# Patient Record
Sex: FEMALE | Race: WHITE | Employment: OTHER | URBAN - METROPOLITAN AREA
[De-identification: names, ages, dates, MRNs, and addresses within clinical notes are randomized per-mention and may not be internally consistent; named-entity substitution may affect disease eponyms.]

---

## 2019-02-11 ENCOUNTER — HOSPITAL ENCOUNTER (EMERGENCY)
Age: 69
Discharge: HOME | End: 2019-02-11
Payer: COMMERCIAL

## 2019-02-11 ENCOUNTER — APPOINTMENT (OUTPATIENT)
Dept: GENERAL RADIOLOGY | Age: 69
End: 2019-02-11
Payer: COMMERCIAL

## 2019-02-11 VITALS
WEIGHT: 130 LBS | TEMPERATURE: 98.5 F | BODY MASS INDEX: 23.04 KG/M2 | SYSTOLIC BLOOD PRESSURE: 146 MMHG | OXYGEN SATURATION: 100 % | HEART RATE: 87 BPM | HEIGHT: 63 IN | RESPIRATION RATE: 16 BRPM | DIASTOLIC BLOOD PRESSURE: 88 MMHG

## 2019-02-11 DIAGNOSIS — M77.8 TENDINITIS OF SHOULDER, LEFT: Primary | ICD-10-CM

## 2019-02-11 PROCEDURE — 96372 THER/PROPH/DIAG INJ SC/IM: CPT

## 2019-02-11 PROCEDURE — 99283 EMERGENCY DEPT VISIT LOW MDM: CPT

## 2019-02-11 PROCEDURE — 73030 X-RAY EXAM OF SHOULDER: CPT

## 2019-02-11 PROCEDURE — 6360000002 HC RX W HCPCS: Performed by: PHYSICIAN ASSISTANT

## 2019-02-11 RX ORDER — METHYLPREDNISOLONE ACETATE 80 MG/ML
80 INJECTION, SUSPENSION INTRA-ARTICULAR; INTRALESIONAL; INTRAMUSCULAR; SOFT TISSUE ONCE
Status: COMPLETED | OUTPATIENT
Start: 2019-02-11 | End: 2019-02-11

## 2019-02-11 RX ORDER — ETODOLAC 400 MG/1
400 TABLET, FILM COATED ORAL 2 TIMES DAILY
Qty: 14 TABLET | Refills: 0 | Status: SHIPPED | OUTPATIENT
Start: 2019-02-11

## 2019-02-11 RX ORDER — TRAMADOL HYDROCHLORIDE 50 MG/1
50 TABLET ORAL EVERY 8 HOURS PRN
Qty: 9 TABLET | Refills: 0 | Status: SHIPPED | OUTPATIENT
Start: 2019-02-11 | End: 2019-02-14

## 2019-02-11 RX ORDER — KETOROLAC TROMETHAMINE 30 MG/ML
30 INJECTION, SOLUTION INTRAMUSCULAR; INTRAVENOUS ONCE
Status: COMPLETED | OUTPATIENT
Start: 2019-02-11 | End: 2019-02-11

## 2019-02-11 RX ADMIN — METHYLPREDNISOLONE ACETATE 80 MG: 80 INJECTION, SUSPENSION INTRA-ARTICULAR; INTRALESIONAL; INTRAMUSCULAR; SOFT TISSUE at 00:52

## 2019-02-11 RX ADMIN — KETOROLAC TROMETHAMINE 30 MG: 30 INJECTION, SOLUTION INTRAMUSCULAR; INTRAVENOUS at 00:52

## 2019-02-11 ASSESSMENT — PAIN DESCRIPTION - ORIENTATION: ORIENTATION: LEFT

## 2019-02-11 ASSESSMENT — ENCOUNTER SYMPTOMS
COLOR CHANGE: 0
ABDOMINAL PAIN: 0
ALLERGIC/IMMUNOLOGIC NEGATIVE: 1
TROUBLE SWALLOWING: 0
APNEA: 0
SHORTNESS OF BREATH: 0
EYE PAIN: 0

## 2019-02-11 ASSESSMENT — PAIN DESCRIPTION - PAIN TYPE: TYPE: ACUTE PAIN

## 2019-02-11 ASSESSMENT — PAIN SCALES - GENERAL
PAINLEVEL_OUTOF10: 10
PAINLEVEL_OUTOF10: 9

## 2019-02-11 ASSESSMENT — PAIN DESCRIPTION - FREQUENCY: FREQUENCY: CONTINUOUS

## 2019-02-11 ASSESSMENT — PAIN DESCRIPTION - DESCRIPTORS: DESCRIPTORS: ACHING

## 2019-02-11 ASSESSMENT — PAIN DESCRIPTION - LOCATION: LOCATION: SHOULDER

## 2023-01-06 ENCOUNTER — HOSPITAL ENCOUNTER (EMERGENCY)
Age: 73
Discharge: HOME | End: 2023-01-06
Attending: EMERGENCY MEDICINE
Payer: MEDICARE

## 2023-01-06 ENCOUNTER — APPOINTMENT (OUTPATIENT)
Dept: CT IMAGING | Age: 73
End: 2023-01-06
Payer: MEDICARE

## 2023-01-06 ENCOUNTER — APPOINTMENT (OUTPATIENT)
Dept: GENERAL RADIOLOGY | Age: 73
End: 2023-01-06
Payer: MEDICARE

## 2023-01-06 VITALS
DIASTOLIC BLOOD PRESSURE: 85 MMHG | RESPIRATION RATE: 18 BRPM | WEIGHT: 128 LBS | OXYGEN SATURATION: 98 % | BODY MASS INDEX: 22.68 KG/M2 | HEIGHT: 63 IN | SYSTOLIC BLOOD PRESSURE: 125 MMHG | HEART RATE: 78 BPM | TEMPERATURE: 97.8 F

## 2023-01-06 DIAGNOSIS — S82.892A CLOSED FRACTURE OF LEFT ANKLE, INITIAL ENCOUNTER: ICD-10-CM
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
## 2023-01-06 PROCEDURE — 99284 EMERGENCY DEPT VISIT MOD MDM: CPT

## 2023-01-06 PROCEDURE — 73610 X-RAY EXAM OF ANKLE: CPT

## 2023-01-06 PROCEDURE — 73030 X-RAY EXAM OF SHOULDER: CPT

## 2023-01-06 PROCEDURE — 73502 X-RAY EXAM HIP UNI 2-3 VIEWS: CPT

## 2023-01-06 PROCEDURE — 72125 CT NECK SPINE W/O DYE: CPT

## 2023-01-06 PROCEDURE — 70450 CT HEAD/BRAIN W/O DYE: CPT

## 2023-01-06 RX ORDER — OXYCODONE HYDROCHLORIDE AND ACETAMINOPHEN 5; 325 MG/1; MG/1
1 TABLET ORAL EVERY 6 HOURS PRN
Qty: 12 TABLET | Refills: 0 | Status: SHIPPED | OUTPATIENT
Start: 2023-01-06 | End: 2023-01-09

## 2023-01-06 RX ORDER — OXYCODONE HYDROCHLORIDE AND ACETAMINOPHEN 5; 325 MG/1; MG/1
1 TABLET ORAL
Status: DISCONTINUED | OUTPATIENT
Start: 2023-01-06 | End: 2023-01-06 | Stop reason: HOSPADM

## 2023-01-06 ASSESSMENT — PAIN DESCRIPTION - FREQUENCY: FREQUENCY: INTERMITTENT

## 2023-01-06 ASSESSMENT — PAIN SCALES - GENERAL: PAINLEVEL_OUTOF10: 2

## 2023-01-06 ASSESSMENT — PAIN DESCRIPTION - ORIENTATION: ORIENTATION: RIGHT;LEFT

## 2023-01-06 ASSESSMENT — ENCOUNTER SYMPTOMS
VOMITING: 0
BACK PAIN: 0
SORE THROAT: 0
DIARRHEA: 0
NAUSEA: 0
ABDOMINAL PAIN: 0
COUGH: 0
SHORTNESS OF BREATH: 0

## 2023-01-06 ASSESSMENT — PAIN - FUNCTIONAL ASSESSMENT
PAIN_FUNCTIONAL_ASSESSMENT: NONE - DENIES PAIN
PAIN_FUNCTIONAL_ASSESSMENT: 0-10

## 2023-01-06 ASSESSMENT — PAIN DESCRIPTION - LOCATION: LOCATION: BACK;ANKLE;LEG

## 2023-01-06 ASSESSMENT — PAIN DESCRIPTION - DESCRIPTORS: DESCRIPTORS: ACHING

## 2023-01-06 ASSESSMENT — PAIN DESCRIPTION - PAIN TYPE: TYPE: ACUTE PAIN

## 2023-01-06 ASSESSMENT — LIFESTYLE VARIABLES: HOW OFTEN DO YOU HAVE A DRINK CONTAINING ALCOHOL: NEVER

## 2023-01-06 NOTE — ED TRIAGE NOTES
Pt states that she fell down 2 steps on Monday after tripping. Pt states that since then she has been having left lower leg pain, right ankle pain, and lower back pain. Pt states that she thinks she may have had LOC when she fell for a few seconds. Pt states that she hit her head and was having pain on the right side of her face earlier this week.

## 2023-01-06 NOTE — ED PROVIDER NOTES
[address] [facility] ED  eMERGENCYdEPARTMENT eNCOUnter      Pt Name: Anne Bernal  MRN: [id number]  [place] 1950  Date of evaluation: 1/6/2023  Eliana Quiroga MD    CHIEF COMPLAINT           HPI  Anne Bernal is a 74 y.o. female per chart review has a h/o depression presents to the ED s/p fall. Pt notes on Monday she was carrying things, lost her balance and fell and hit her head. Pt notes moderate, constant, aching R and L ankle pain and R hip pain. Pt hit her head but denies LOC. Pt denies fever, neck pain, cp, sob, ab pain, dysuria, hematuria, diarrhea. ROS  Review of Systems   Constitutional:  Negative for activity change, chills and fever. HENT:  Negative for ear pain and sore throat. Eyes:  Negative for visual disturbance. Respiratory:  Negative for cough and shortness of breath. Cardiovascular:  Negative for chest pain, palpitations and leg swelling. Gastrointestinal:  Negative for abdominal pain, diarrhea, nausea and vomiting. Genitourinary:  Negative for dysuria. Musculoskeletal:  Negative for back pain. R ankle pain, L ankle pain, R hip pain     Skin:  Negative for rash. Neurological:  Negative for dizziness and weakness. Except as noted above the remainder of the review of systems was reviewed and negative. PAST MEDICAL HISTORY     Past Medical History:   Diagnosis Date    Cancer [facility])     Depression     Thyroid disease          SURGICAL HISTORY       Past Surgical History:   Procedure Laterality Date    HYSTERECTOMY (CERVIX STATUS UNKNOWN)      TONSILLECTOMY           CURRENTMEDICATIONS       Previous Medications    B COMPLEX VITAMINS CAPSULE    Take 1 capsule by mouth daily. DESVENLAFAXINE (PRISTIQ) 50 MG TB24    Take 50 mg by mouth daily. ETODOLAC (LODINE) 400 MG TABLET    Take 1 tablet by mouth 2 times daily    LEVOTHYROXINE (SYNTHROID) 25 MCG TABLET    Take 50 mcg by mouth daily.          ALLERGIES     Avelox [moxifloxacin hcl in nacl], Codeine, and Pcn [penicillins]    FAMILY HISTORY     History reviewed. No pertinent family history. SOCIAL HISTORY       Social History     Socioeconomic History    Marital status: Single     Spouse name: None    Number of children: None    Years of education: None    Highest education level: None   Tobacco Use    Smoking status: Never   Substance and Sexual Activity    Alcohol use: No    Drug use: No         PHYSICAL EXAM       ED Triage Vitals [01/06/23 1614]   BP Temp Temp Source Heart Rate Resp SpO2 Height Weight   136/74 97.8 °F (36.6 °C) Oral 78 18 99 % 5' 3\" (1.6 m) 128 lb (58.1 kg)       Physical Exam  Vitals and nursing note reviewed. Constitutional:       Appearance: She is well-developed. HENT:      Head: Normocephalic. Right Ear: External ear normal.      Left Ear: External ear normal.   Eyes:      Conjunctiva/sclera: Conjunctivae normal.      Pupils: Pupils are equal, round, and reactive to light. Cardiovascular:      Rate and Rhythm: Normal rate and regular rhythm. Heart sounds: Normal heart sounds. Pulmonary:      Effort: Pulmonary effort is normal.      Breath sounds: Normal breath sounds. Abdominal:      General: Bowel sounds are normal. There is no distension. Palpations: Abdomen is soft. Tenderness: There is no abdominal tenderness. Musculoskeletal:         General: Normal range of motion. Cervical back: Normal range of motion and neck supple. Comments: +R ankle pain and swelling. +Bruising. 2+ R DP pulse. +L ankle pain and swelling. 2+ L DP pulse. +R hip pain. Full ROM of R hip. Skin:     General: Skin is warm and dry. Neurological:      Mental Status: She is alert and oriented to person, place, and time. Psychiatric:         Mood and Affect: Mood normal.         MDM  73 yo female presents to the ED s/p fall on Monday. Pt is afebrile, hemodynamically stable. Pt given PO percocet in the ED. CT head, cspine negative. XR of R ankle negative.   XR of L ankle shows lateral malleolus fx. XR of shoulder, hip negative. Pt reassessed and doing better. Pt placed in a L ankle splint. Pt given prescription for percocet, ortho referral placed. Pt given ankle fx warning signs and will f/u with pcp. FINAL IMPRESSION      1. Fall, initial encounter    2.  Closed fracture of left ankle, initial encounter          DISPOSITION/PLAN   DISPOSITION Decision To Discharge 01/06/2023 06:51:59 PM        DISCHARGE MEDICATIONS:  [unfilled]         Sherman Mary MD(electronically signed)  Attending Emergency Physician            Sherman Mary MD  01/06/23 9796

## 2023-01-07 NOTE — ED NOTES
D/C instructions and rx given. Verbalized understanding. Denies any complaints at this time. States she will drive herself home. Took patient out via wheelchair with her crutches to her car.      Becky Sow RN  01/06/23 [id number]

## 2023-09-04 PROBLEM — R32 INCONTINENCE: Status: ACTIVE | Noted: 2023-09-04

## 2023-09-04 PROBLEM — F41.8 DEPRESSION WITH ANXIETY: Status: ACTIVE | Noted: 2023-09-04

## 2023-09-04 PROBLEM — E03.9 HYPOTHYROIDISM: Status: ACTIVE | Noted: 2023-09-04

## 2023-09-04 PROBLEM — F33.9 MAJOR DEPRESSION, RECURRENT (CMS-HCC): Status: ACTIVE | Noted: 2023-09-04

## 2023-09-04 PROBLEM — F41.9 ANXIETY: Status: ACTIVE | Noted: 2023-09-04

## 2023-09-04 PROBLEM — I65.29 CAROTID STENOSIS: Status: ACTIVE | Noted: 2023-09-04

## 2023-09-04 PROBLEM — R07.9 CHEST PAIN: Status: ACTIVE | Noted: 2023-09-04

## 2023-09-04 PROBLEM — K63.5 COLONIC POLYP: Status: ACTIVE | Noted: 2023-09-04

## 2023-09-04 PROBLEM — I10 HTN (HYPERTENSION), BENIGN: Status: ACTIVE | Noted: 2023-09-04

## 2023-09-04 PROBLEM — L90.0 LICHEN SCLEROSUS: Status: ACTIVE | Noted: 2023-09-04

## 2023-09-04 PROBLEM — R41.9 COGNITIVE COMPLAINTS: Status: ACTIVE | Noted: 2023-09-04

## 2023-09-04 PROBLEM — D21.9 LEIOMYOMA: Status: ACTIVE | Noted: 2023-09-04

## 2023-09-04 PROBLEM — M48.02 SPINAL STENOSIS OF CERVICAL REGION: Status: ACTIVE | Noted: 2023-09-04

## 2023-09-04 PROBLEM — F43.10 PTSD (POST-TRAUMATIC STRESS DISORDER): Status: ACTIVE | Noted: 2023-09-04

## 2023-09-04 PROBLEM — M19.012 DJD OF LEFT SHOULDER: Status: ACTIVE | Noted: 2023-09-04

## 2023-09-04 PROBLEM — N39.46 MIXED STRESS AND URGE URINARY INCONTINENCE: Status: ACTIVE | Noted: 2023-09-04

## 2023-09-04 PROBLEM — E78.5 HLD (HYPERLIPIDEMIA): Status: ACTIVE | Noted: 2023-09-04

## 2023-09-04 PROBLEM — N95.2 VAGINAL ATROPHY: Status: ACTIVE | Noted: 2023-09-04

## 2023-09-04 PROBLEM — D12.6 ADENOMATOUS COLON POLYP: Status: ACTIVE | Noted: 2023-09-04

## 2023-09-04 RX ORDER — BIOTIN 10 MG
1 TABLET ORAL DAILY
COMMUNITY
End: 2024-02-05 | Stop reason: ALTCHOICE

## 2023-09-04 RX ORDER — DULOXETIN HYDROCHLORIDE 20 MG/1
20 CAPSULE, DELAYED RELEASE ORAL 2 TIMES DAILY
COMMUNITY
Start: 2021-11-10 | End: 2023-10-10 | Stop reason: SDUPTHER

## 2023-09-04 RX ORDER — ALBUTEROL SULFATE 90 UG/1
AEROSOL, METERED RESPIRATORY (INHALATION)
COMMUNITY
Start: 2022-04-26

## 2023-09-04 RX ORDER — LEVOTHYROXINE SODIUM 50 UG/1
1 TABLET ORAL DAILY
COMMUNITY
Start: 2014-10-20

## 2023-09-04 RX ORDER — MIRTAZAPINE 15 MG/1
1 TABLET, FILM COATED ORAL NIGHTLY
COMMUNITY
Start: 2019-04-16 | End: 2023-10-02 | Stop reason: SDUPTHER

## 2023-09-04 RX ORDER — GENTAMICIN SULFATE 3 MG/G
OINTMENT OPHTHALMIC
COMMUNITY
Start: 2020-11-03 | End: 2024-02-05 | Stop reason: ALTCHOICE

## 2023-09-04 RX ORDER — LORAZEPAM 0.5 MG/1
0.5 TABLET ORAL
COMMUNITY
Start: 2020-01-28

## 2023-09-04 RX ORDER — CALCIUM CARBONATE 260MG(650)
1 TABLET,CHEWABLE ORAL DAILY
COMMUNITY

## 2023-09-04 RX ORDER — MULTIVITAMIN
1 TABLET ORAL DAILY
COMMUNITY

## 2023-09-04 RX ORDER — CALCIUM CARBONATE/VITAMIN D3 500-10/5ML
1 LIQUID (ML) ORAL DAILY
COMMUNITY

## 2023-09-04 RX ORDER — TRIAMCINOLONE ACETONIDE 1 MG/G
PASTE DENTAL
COMMUNITY
Start: 2021-04-15

## 2023-09-04 RX ORDER — CLINDAMYCIN PHOSPHATE 10 UG/ML
LOTION TOPICAL
COMMUNITY
Start: 2021-09-29 | End: 2024-02-05 | Stop reason: ALTCHOICE

## 2023-09-04 RX ORDER — CLOBETASOL PROPIONATE 0.5 MG/G
OINTMENT TOPICAL
COMMUNITY
Start: 2019-12-10

## 2023-09-04 RX ORDER — ESTRADIOL 0.1 MG/G
CREAM VAGINAL 2 TIMES WEEKLY
COMMUNITY
Start: 2019-12-10

## 2023-09-04 RX ORDER — FLUTICASONE PROPIONATE 50 MCG
1 SPRAY, SUSPENSION (ML) NASAL
COMMUNITY
Start: 2019-03-15

## 2023-10-02 DIAGNOSIS — F41.8 DEPRESSION WITH ANXIETY: ICD-10-CM

## 2023-10-02 RX ORDER — MIRTAZAPINE 15 MG/1
15 TABLET, FILM COATED ORAL NIGHTLY
Qty: 90 TABLET | Refills: 1 | Status: SHIPPED | OUTPATIENT
Start: 2023-10-02 | End: 2024-03-04 | Stop reason: DRUGHIGH

## 2023-10-10 ENCOUNTER — TELEMEDICINE (OUTPATIENT)
Dept: BEHAVIORAL HEALTH | Facility: CLINIC | Age: 73
End: 2023-10-10
Payer: MEDICARE

## 2023-10-10 DIAGNOSIS — F33.41 RECURRENT MAJOR DEPRESSIVE DISORDER, IN PARTIAL REMISSION (CMS-HCC): ICD-10-CM

## 2023-10-10 DIAGNOSIS — F41.9 ANXIETY: ICD-10-CM

## 2023-10-10 PROCEDURE — 99214 OFFICE O/P EST MOD 30 MIN: CPT | Performed by: PSYCHIATRY & NEUROLOGY

## 2023-10-10 RX ORDER — DOXYCYCLINE 100 MG/1
100 TABLET ORAL DAILY
COMMUNITY

## 2023-10-10 RX ORDER — DULOXETIN HYDROCHLORIDE 20 MG/1
20 CAPSULE, DELAYED RELEASE ORAL 2 TIMES DAILY
Qty: 180 CAPSULE | Refills: 1 | Status: SHIPPED | OUTPATIENT
Start: 2023-10-10 | End: 2024-02-05 | Stop reason: SDUPTHER

## 2023-10-10 ASSESSMENT — ENCOUNTER SYMPTOMS
DEPRESSION: 0
LOSS OF SENSATION IN FEET: 0
OCCASIONAL FEELINGS OF UNSTEADINESS: 0

## 2023-10-10 NOTE — PROGRESS NOTES
"Outpatient Psychiatry      Reason for Visit:   Follow up for depression, anxiety    Subjective   MsJillian Gomez is a 73-year-old woman with a history of depression, anxiety. Follow up done virtually on phone today (could not connect on [employer] or [email or web address]).     She says she is feeling \"okay.\"     She says she was taking duloxetine 60mg but she started to notice nausea so she decreased the dose to 40mg (20mg twice daily) in July and the nausea resolved.     She says her mood is stable. She says she feels \"good.\" She says she has not been crying like she used to.   Denies anhedonia - likes to read.   Sleep - okay. Mirtazapine helps with sleep. She says she loves staying up late but is trying to get enough sleep.   Appetite - stable.   Has good energy - goes to exercise regularly.   Concentration - fair.   No death wishes, suicidal thoughts, intent or plans.    She says she had a lot of stress in her new home because of a lot of issues but things are better now. She says she has not taken ativan in a long time.     No AVH. No paranoia or delusions.     Denies any new concerns with memory. She has baseline difficulties with word-finding. She says she has noticed problems with recalling names of people.   No problems driving. Uses a navigation marjan on her phone when driving.   Manages finances independently.   Takes medications on her own - uses alarm on her phone.     She says she has rosacea - trying to switch dermatologist and has to get a cream from a compounding pharmacy.     Current medications:   Duloxetine 20mg n the morning and 20mg in the evening.  Mirtazapine 7.5mg at bedtime.   Lorazepam 0.5mg - takes quarter to half tablet as needed; has not taken it in a while.     Past medications:   Lexapro - worked for some time, stopped working after some time.   Lamotrigine - did not work  Luvox - \"awful\"   Pristiq - could not tolerate 100 mg dose.   Sertraline  Fluoxetine  Trintellix - side effects.      Current " Medications:    Current Outpatient Medications:     albuterol (ProAir HFA) 90 mcg/actuation inhaler, Inhale., Disp: , Rfl:     biotin 10 mg tablet, Take 1 tablet (10 mg) by mouth once daily., Disp: , Rfl:     clindamycin (Cleocin T) 1 % lotion, Apply topically., Disp: , Rfl:     clobetasol (Temovate) 0.05 % ointment, Apply topically., Disp: , Rfl:     DULoxetine (Cymbalta) 20 mg DR capsule, Take 2 capsules (40 mg) by mouth once daily in the morning., Disp: , Rfl:     estradiol (Estrace) 0.01 % (0.1 mg/gram) vaginal cream, Insert into the vagina 2 times a week., Disp: , Rfl:     fluticasone (Flonase) 50 mcg/actuation nasal spray, Administer 1 spray into affected nostril(s) once daily., Disp: , Rfl:     gentamicin (Gentak) 0.3 % (3 mg/gram) ophthalmic ointment, Apply to affected eye(s)., Disp: , Rfl:     levothyroxine (Synthroid) 50 mcg tablet, Take 1 tablet (50 mcg) by mouth once daily., Disp: , Rfl:     LORazepam (Ativan) 0.5 mg tablet, Take 1 tablet (0.5 mg) by mouth., Disp: , Rfl:     magnesium citrate 100 mg tablet, Take 1 tablet by mouth once daily., Disp: , Rfl:     mirtazapine (Remeron) 15 mg tablet, Take 1 tablet (15 mg) by mouth once daily at bedtime., Disp: 90 tablet, Rfl: 1    multivitamin tablet, Take 1 tablet by mouth once daily., Disp: , Rfl:     triamcinolone (Kenalog) 0.1 % oral paste, Take by mouth., Disp: , Rfl:     zinc gluconate-zinc picolinate 30 mg capsule, Take 1 capsule by mouth once daily., Disp: , Rfl:   Medical History:  Past Medical History:   Diagnosis Date    Major depressive disorder, recurrent, unspecified (CMS/HCC) 03/20/2020    Major depression, recurrent    Other specified diseases of blood and blood-forming organs 03/21/2019    Macrocytosis without anemia    Personal history of malignant neoplasm of ovary     History of ovarian cancer    Personal history of other mental and behavioral disorders     History of major depression    Personal history of other mental and behavioral  disorders     History of obsessive compulsive disorder    Personal history of traumatic brain injury     History of concussion     Surgical History:  Past Surgical History:   Procedure Laterality Date    OTHER SURGICAL HISTORY  08/13/2018    Ovarian Cystectomy     Family History:  Family History   Problem Relation Name Age of Onset    Brain cancer Mother      Kidney cancer Father      Prostate cancer Father      Other (bladder cancer) Father      Other (Mood disturbance) Father      Other (Mood disturbance) Sister      Other (Opioid abuse) Sister      Other (Rage attacks) Sister      Autoimmune disease Other Maternal Relatives     Autoimmune disease Other Paternal Relatives      Social History:  Social History     Socioeconomic History    Marital status:      Spouse name: Not on file    Number of children: Not on file    Years of education: Not on file    Highest education level: Not on file   Occupational History    Not on file   Tobacco Use    Smoking status: Not on file    Smokeless tobacco: Not on file   Substance and Sexual Activity    Alcohol use: Not on file    Drug use: Not on file    Sexual activity: Not on file   Other Topics Concern    Not on file   Social History Narrative    Not on file     Social Determinants of Health     Financial Resource Strain: Not on file   Food Insecurity: Not on file   Transportation Needs: Not on file   Physical Activity: Not on file   Stress: Not on file   Social Connections: Not on file   Intimate Partner Violence: Not on file   Housing Stability: Not on file       Additional historical information includes: Has seen neurosurgery and was 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 She was also told in the past that she has frontal volume loss but was told at recent visit that it was not too significant.     Her father and paternal grandmother were diagnosed with Alzheimer's disease.     Record Review: brief    "    Psychiatric Review Of Systems:  As above.     Medical Review Of Systems:  Constitutional: Negative  Eyes: negative  Ears, nose, mouth, throat, and face: negative  Respiratory: negative  Cardiovascular: negative  Gastrointestinal: negative  Genitourinary:negative  Integumentary: negative, as above  Musculoskeletal:negative  Neurological: negative      Objective   Mental Status Exam:   Behavior/Attitude: Cooperative.   Speech: Regular in rate, tone and volume. No pressure.  Mood: \"stable\"  Thought process: Goal-directed; organized.   Thought content: No paranoid or delusional content. No hallucinations in auditory, visual or other sensory modalities.   Suicidal ideation: denied.  Homicidal ideation: denied.   Insight: Fair  Judgment: Fair  Recent and remote memory: intact based on recall of recent and remote autobiographical memories.  Attention/concentration: intact during telephone visit.   Language: No aphasia or paraphasic errors.   Fund of knowledge: Average      Vitals:  There were no vitals filed for this visit.    Assessment/Plan   Diagnosis:   Major depressive disorder, recurrent, moderate  Generalized anxiety disorder  Cognitive complaints.     Assessment:   Ms. Nikkie Gomez is a 73-year-old woman with a history of depression, anxiety. Follow up done virtually by phone today.    10/10/23: Mood seems stable on current regimen. Could not tolerate duloxetine 60mg in divided doses but is tolerating 20mg twice daily.   She has some worry about cognitive problems but recent neuropsychological testing results were reassuring and her functioning has been stable.       Treatment Plan/Recommendations:   - Continue duloxetine 20mg in the morning and 20mg in the evening.   - Continue mirtazapine 7.5mg (half of 15mg tab) at bedtime.   - May take lorazepam 0.5mg half to one tablet daily for severe anxiety. Monitor carefully for excessive sedation or confusion.   - Follow up in about 3 months.  - Call sooner " ([phone]) in case of any questions or concerns.       Review with patient: Treatment plan reviewed with the patient.  Medication risks/benefit reviewed with the patient      Lucie Mederos MD

## 2023-10-10 NOTE — PATIENT INSTRUCTIONS
Plan:   - Continue duloxetine 20mg in the morning and 20mg in the evening.  - Continue mirtazapine 7.5mg (half of 15mg tab) at bedtime.   - May take lorazepam 0.5mg half to one tablet daily for severe anxiety. Monitor carefully for excessive sedation or confusion when using it.   - Follow up in about 3 months.  - Call sooner ([phone]) in case of any questions or concerns.    Brain healthy lifestyle:   - Make sure your medical conditions (if any) such as diabetes, high blood pressure, high cholesterol, thyroid disease sleep apnea are optimally controlled.   - Use eyeglasses or hearing aids appropriately if needed.   - Eat a heart healthy diet (like a Mediterranean diet; lots of fruits and vegetables, fish; low fat;)  - Exercise regularly as tolerated.   - Maintain good sleep hygiene; avoid daytime naps; try to get 7 to 8 hours of continuous sleep at night.   - Stay mentally active - puzzles, word searches, books, playing cards.  - Stay socially active and engaged.

## 2024-02-01 ENCOUNTER — TELEPHONE (OUTPATIENT)
Dept: BEHAVIORAL HEALTH | Facility: CLINIC | Age: 74
End: 2024-02-01

## 2024-02-05 ENCOUNTER — TELEMEDICINE (OUTPATIENT)
Dept: BEHAVIORAL HEALTH | Facility: CLINIC | Age: 74
End: 2024-02-05
Payer: MEDICARE

## 2024-02-05 DIAGNOSIS — F33.41 RECURRENT MAJOR DEPRESSIVE DISORDER, IN PARTIAL REMISSION (CMS-HCC): ICD-10-CM

## 2024-02-05 DIAGNOSIS — F41.9 ANXIETY: ICD-10-CM

## 2024-02-05 PROCEDURE — 99213 OFFICE O/P EST LOW 20 MIN: CPT | Performed by: PSYCHIATRY & NEUROLOGY

## 2024-02-05 RX ORDER — DULOXETIN HYDROCHLORIDE 30 MG/1
CAPSULE, DELAYED RELEASE ORAL
Qty: 180 CAPSULE | Refills: 1 | Status: SHIPPED | OUTPATIENT
Start: 2024-02-05

## 2024-02-05 NOTE — PROGRESS NOTES
"Outpatient Psychiatry      Reason for Visit:   Follow up for depression, anxiety    Subjective   Ms. Nikkie Gomez is a 73-year-old woman with a history of depression, anxiety. Follow up done virtually on phone today (could not connect on [employer] or [employer]).     She says she is feeling \"pretty good this morning.\"     She says she was crying last week for 2 days. She says she cannot say why but the weather being gloomy is possibly part of it. She says that on Feb 1st, she started taking 60mg duloxetine (added 20mg to the 20mg twice daily dose). She takes 20mg around 10AM, 20mg at 1PM, and 20mg at 6PM. She says she feels somewhat better now.   She says she also took a quarter of lorazepam once last week.     Denies anhedonia - likes to read.   Sleep - about 5 and a half hours, which is a little less than her optimal of 7 to 8 hours. She does not usually take a nap. Mirtazapine helps with sleep (although higher dose caused restless legs).   Appetite - stable.   Has good energy. She goes to the fitness center every other day.   Concentration - fair.   No death wishes, suicidal thoughts, intent or plans.    No AVH. No paranoia or delusions.     She has not been using lightbox consistently.     Denies any new concerns with memory. She has baseline difficulties with word-finding.   No problems driving. Uses a navigation marjan on her phone when driving.   Manages finances independently.   Takes medications on her own - uses alarm on her phone.     She has not seen Didi Kim for psychotherapy recently.     Stressors: She says her sister had a skin issue and had a biopsy done recently; says she is worried about her.     Current medications:   Duloxetine 20mg in the morning, 20mg in the afternoon and 20mg in the evening.  Mirtazapine 7.5mg at bedtime.   Lorazepam 0.5mg - takes quarter to half tablet as needed; has not taken it in a while.     Past medications:   Lexapro - worked for some time, stopped working after some time. " "  Lamotrigine - did not work  Luvox - \"awful\"   Pristiq - could not tolerate 100 mg dose.   Sertraline  Fluoxetine  Trintellix - side effects.      Current Medications:    Current Outpatient Medications:     albuterol (ProAir HFA) 90 mcg/actuation inhaler, Inhale., Disp: , Rfl:     biotin 10 mg tablet, Take 1 tablet (10 mg) by mouth once daily., Disp: , Rfl:     clindamycin (Cleocin T) 1 % lotion, Apply topically., Disp: , Rfl:     clobetasol (Temovate) 0.05 % ointment, Apply topically., Disp: , Rfl:     doxycycline (Adoxa) 100 mg tablet, Take 1 tablet (100 mg) by mouth once daily. Take with a full glass of water and do not lie down for at least 30 minutes after, Disp: , Rfl:     DULoxetine (Cymbalta) 20 mg DR capsule, Take 1 capsule (20 mg) by mouth 2 times a day., Disp: 180 capsule, Rfl: 1    estradiol (Estrace) 0.01 % (0.1 mg/gram) vaginal cream, Insert into the vagina 2 times a week., Disp: , Rfl:     fluticasone (Flonase) 50 mcg/actuation nasal spray, Administer 1 spray into affected nostril(s) once daily., Disp: , Rfl:     gentamicin (Gentak) 0.3 % (3 mg/gram) ophthalmic ointment, Apply to affected eye(s)., Disp: , Rfl:     levothyroxine (Synthroid) 50 mcg tablet, Take 1 tablet (50 mcg) by mouth once daily., Disp: , Rfl:     LORazepam (Ativan) 0.5 mg tablet, Take 1 tablet (0.5 mg) by mouth., Disp: , Rfl:     magnesium citrate 100 mg tablet, Take 1 tablet by mouth once daily., Disp: , Rfl:     mirtazapine (Remeron) 15 mg tablet, Take 1 tablet (15 mg) by mouth once daily at bedtime. (Patient taking differently: Take 0.5 tablets (7.5 mg) by mouth once daily at bedtime.), Disp: 90 tablet, Rfl: 1    multivitamin tablet, Take 1 tablet by mouth once daily., Disp: , Rfl:     triamcinolone (Kenalog) 0.1 % oral paste, Take by mouth., Disp: , Rfl:     zinc gluconate-zinc picolinate 30 mg capsule, Take 1 capsule by mouth once daily., Disp: , Rfl:   Medical History:  Past Medical History:   Diagnosis Date    Major " depressive disorder, recurrent, unspecified (CMS/[facility]) 03/20/2020    Major depression, recurrent    Other specified diseases of blood and blood-forming organs 03/21/2019    Macrocytosis without anemia    Personal history of malignant neoplasm of ovary     History of ovarian cancer    Personal history of other mental and behavioral disorders     History of major depression    Personal history of other mental and behavioral disorders     History of obsessive compulsive disorder    Personal history of traumatic brain injury     History of concussion     Surgical History:  Past Surgical History:   Procedure Laterality Date    OTHER SURGICAL HISTORY  08/13/2018    Ovarian Cystectomy     Family History:  Family History   Problem Relation Name Age of Onset    Brain cancer Mother      Kidney cancer Father      Prostate cancer Father      Other (bladder cancer) Father      Other (Mood disturbance) Father      Other (Mood disturbance) Sister      Other (Opioid abuse) Sister      Other (Rage attacks) Sister      Autoimmune disease Other Maternal Relatives     Autoimmune disease Other Paternal Relatives      Social History:  Social History     Socioeconomic History    Marital status:      Spouse name: Not on file    Number of children: Not on file    Years of education: Not on file    Highest education level: Not on file   Occupational History    Not on file   Tobacco Use    Smoking status: Never    Smokeless tobacco: Never   Substance and Sexual Activity    Alcohol use: Not on file    Drug use: Never    Sexual activity: Not on file   Other Topics Concern    Not on file   Social History Narrative    Not on file     Social Determinants of Health     Financial Resource Strain: Not on file   Food Insecurity: Not on file   Transportation Needs: Not on file   Physical Activity: Not on file   Stress: Not on file   Social Connections: Not on file   Intimate Partner Violence: Not on file   Housing Stability: Not on file  "      Additional historical information includes: Has seen neurosurgery and was 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 She was also told in the past that she has frontal volume loss but was told at recent visit that it was not too significant.     Her father and paternal grandmother were diagnosed with Alzheimer's disease.     Record Review: brief       Psychiatric Review Of Systems:  As above.     Medical Review Of Systems:  Constitutional: Negative  Eyes: negative  Ears, nose, mouth, throat, and face: negative  Respiratory: negative  Cardiovascular: negative  Gastrointestinal: negative  Genitourinary:negative  Integumentary: negative, as above  Musculoskeletal:negative  Neurological: negative  Has had some acne - seeing dermatologist.       Objective   Mental Status Exam:   Appearance: appropriate hygiene and grooming.   Behavior/Attitude: Cooperative.   Speech: Regular in rate, tone and volume. No pressure.  Mood: \"pretty good this morning\"  Affect: close to euthymic but somewhat tearful when talking about her sister's health.   Thought process: Goal-directed; organized.   Thought content: No paranoid or delusional content. No hallucinations in auditory, visual or other sensory modalities.   Suicidal ideation: denied.  Homicidal ideation: denied.   Insight: Fair  Judgment: Fair  Recent and remote memory: intact based on recall of recent and remote autobiographical memories.  Attention/concentration: intact during telephone visit.   Language: No aphasia or paraphasic errors.   Fund of knowledge: Average    Vitals:  There were no vitals filed for this visit.    Assessment/Plan   Diagnosis:   Major depressive disorder, recurrent, moderate  Generalized anxiety disorder  Cognitive complaints.     Assessment:   Ms. Nikkie Gomez is a 73-year-old woman with a history of depression, anxiety. Follow up done virtually by phone today.    2/5/24: Reports increased crying " last week and increased duloxetine to 20mg three times daily (had trouble tolerating 40mg at one time due to nausea). Tends to worry excessively.     Treatment Plan/Recommendations:   - Will change duloxetine to 30mg twice daily for a total of 60mg/day   - Continue mirtazapine 7.5mg (half of 15mg tab) at bedtime.   - May take lorazepam 0.5mg half to one tablet daily for severe anxiety. Monitor carefully for excessive sedation or confusion.   - Supportive therapy provided. Recommended resuming psychotherpay.   - Recommended using lightbox consistently.   - Follow up in about 3 months.      Review with patient: Treatment plan reviewed with the patient.  Medication risks/benefit reviewed with the patient      Lucie Mederos MD

## 2024-02-05 NOTE — PATIENT INSTRUCTIONS
Plan;   - Change duloxetine to 30mg twice daily for a total of 60mg/day. Contact my office in case of any questions or concerns.   - Continue mirtazapine 7.5mg (half of 15mg tab) at bedtime.   - May take lorazepam 0.5mg half to one tablet daily for severe anxiety. Monitor carefully for excessive sedation or confusion.   - Recommend using lightbox consistently.   - Consider resuming counseling/psychotherapy.   - Follow up in about 3 months.  - Contact via [employer] or call sooner ([phone]) in case of any questions or concerns.  - Call [phone] for mental health crisis or suicidal thoughts, or call 911 or go to the nearest emergency room for emergencies.

## 2024-03-04 ENCOUNTER — TELEPHONE (OUTPATIENT)
Dept: OTHER | Age: 74
End: 2024-03-04
Payer: COMMERCIAL

## 2024-03-04 DIAGNOSIS — F41.8 DEPRESSION WITH ANXIETY: ICD-10-CM

## 2024-03-04 RX ORDER — MIRTAZAPINE 7.5 MG/1
11.25 TABLET, FILM COATED ORAL NIGHTLY
Qty: 90 TABLET | Refills: 1 | Status: SHIPPED | OUTPATIENT
Start: 2024-03-04 | End: 2024-03-04 | Stop reason: SDUPTHER

## 2024-03-04 RX ORDER — MIRTAZAPINE 7.5 MG/1
11.25 TABLET, FILM COATED ORAL NIGHTLY
Qty: 90 TABLET | Refills: 1 | Status: SHIPPED | OUTPATIENT
Start: 2024-03-04 | End: 2024-05-06 | Stop reason: SDUPTHER

## 2024-03-04 NOTE — TELEPHONE ENCOUNTER
"Spoke with patient - she says that for the past few weeks, she has been having a feeling of \"anxiety\" in her stomach. She says she feels depressed and does not feel good about getting up in the morning. No SI reported. She says yesterday, she spent most of the day crying. She took a quarter of lorazepam in the evening. She says she is feeling a little better today.     She says she is using duloxetine 20mg three times daily for now; she also takes mirtazapine 7.5mg at bedtime (had trouble with restlessness on higher dose). She says she has a lightbox but does not use it consistently. She has not gone back to therapy. Advised to resume therapy with previous provider or with a new provider.     She says she is dealing with some stress - some issues with her senior care account; she is also planning to visit her children in [place] in April. She also has a lot of things that she has to sort through at home.     Advised to increase mirtazapine to 11.25mg (three quarters of 15mg or one and a half of 7.5mg tabs). Advised to update as needed and follow up as scheduled; or sooner if needed. Patient verbalized understanding of and agreement with the plan.    "

## 2024-03-20 ENCOUNTER — TELEPHONE (OUTPATIENT)
Dept: OTHER | Age: 74
End: 2024-03-20
Payer: COMMERCIAL

## 2024-03-21 NOTE — TELEPHONE ENCOUNTER
"She says she feels down. She says she talked to her gynecologist who told her it was not related to her hormones.   She says she feels \"real cold\" all the time; she says she was also constipated. She says she does not have an endocrinologist currently; her PCP told her that her recent thyroid test was \"borderline.\"     She is taking mirtazapine 11.25mg and has been tolerating it okay. She is taking duloxetine 20mg three times daily (total 60mg/day).     Discussed options - advised to increase mirtazapine to 15mg at bedtime. Also advised to talk to her PCP about need for adjusting her thyroid medication. If this is not effective, then we have to consider switching to another antidepressant.    "

## 2024-05-06 ENCOUNTER — TELEPHONE (OUTPATIENT)
Dept: OTHER | Age: 74
End: 2024-05-06
Payer: MEDICARE

## 2024-05-06 ENCOUNTER — TELEPHONE (OUTPATIENT)
Dept: BEHAVIORAL HEALTH | Facility: CLINIC | Age: 74
End: 2024-05-06
Payer: MEDICARE

## 2024-05-06 DIAGNOSIS — F41.8 DEPRESSION WITH ANXIETY: ICD-10-CM

## 2024-05-06 RX ORDER — MIRTAZAPINE 7.5 MG/1
TABLET, FILM COATED ORAL
Qty: 180 TABLET | Refills: 1 | Status: SHIPPED | OUTPATIENT
Start: 2024-05-06 | End: 2024-05-08 | Stop reason: SDUPTHER

## 2024-05-06 NOTE — TELEPHONE ENCOUNTER
Caller: pt - has been doubling up on medication to avoid agitation. Will be running out soon.    Medication:  Mirtazipine 15mgs    Pharmacy: [facility]     Next visit: 5.13.24

## 2024-05-08 ENCOUNTER — TELEPHONE (OUTPATIENT)
Dept: BEHAVIORAL HEALTH | Facility: CLINIC | Age: 74
End: 2024-05-08
Payer: MEDICARE

## 2024-05-08 DIAGNOSIS — F41.8 DEPRESSION WITH ANXIETY: ICD-10-CM

## 2024-05-08 RX ORDER — MIRTAZAPINE 7.5 MG/1
TABLET, FILM COATED ORAL
Qty: 180 TABLET | Refills: 1 | Status: SHIPPED | OUTPATIENT
Start: 2024-05-08 | End: 2024-05-08 | Stop reason: SDUPTHER

## 2024-05-08 RX ORDER — MIRTAZAPINE 15 MG/1
15 TABLET, FILM COATED ORAL NIGHTLY
Qty: 90 TABLET | Refills: 1 | Status: SHIPPED | OUTPATIENT
Start: 2024-05-08

## 2024-05-09 ENCOUNTER — TELEPHONE (OUTPATIENT)
Dept: OTHER | Age: 74
End: 2024-05-09
Payer: MEDICARE

## 2024-05-13 ENCOUNTER — TELEMEDICINE (OUTPATIENT)
Dept: BEHAVIORAL HEALTH | Facility: CLINIC | Age: 74
End: 2024-05-13
Payer: MEDICARE

## 2024-05-13 DIAGNOSIS — F41.9 ANXIETY: ICD-10-CM

## 2024-05-13 DIAGNOSIS — F33.41 RECURRENT MAJOR DEPRESSIVE DISORDER, IN PARTIAL REMISSION (CMS-HCC): ICD-10-CM

## 2024-05-13 PROCEDURE — 1159F MED LIST DOCD IN RCRD: CPT | Performed by: PSYCHIATRY & NEUROLOGY

## 2024-05-13 PROCEDURE — 99214 OFFICE O/P EST MOD 30 MIN: CPT | Performed by: PSYCHIATRY & NEUROLOGY

## 2024-05-13 NOTE — PROGRESS NOTES
"Outpatient Psychiatry      Reason for Visit:   Follow up for depression, anxiety    Subjective   Ms. Nikkie Gomez is a 73-year-old woman with a history of depression, anxiety. Follow up done virtually today. I performed this visit using real-time telehealth tools, including an audio/video connection with patient (at home).     She says she is \"okay.\" She says she has not been crying lately like she was doing during the winter. She says that usually in the spring, she starts to feel better. She had to increase mirtazapine to 15mg at bedtime - tolerating it well. She has been sleeping well except she had some mild agitation but it was not bad.   She is also taking duloxetine 30mg twice daily.   She says that when her rosacea is worse, she gets acne on her face, and this also makes her depressed. It has been better recently.     Denies anhedonia.   Appetite - stable.   Has good energy. She goes to the fitness center every other day.   Concentration - fair.   No death wishes, suicidal thoughts, intent or plans.    She has been using a light-box recently.     She says she has not had to use lorazepam recently except a quarter tablet one day about a month ago.     No AVH. No paranoia or delusions.     Denies any new concerns with memory. She has baseline difficulties with word-finding.   No problems driving. Uses a navigation marjan on her phone when driving.   Manages finances independently.   Takes medications on her own - uses alarm on her phone.     She says she is able to talk to her sister; does not want to see a psychotherapist.     No significant physical health concerns. She says she has a susceptibility gene [facility] for renal cancer and gets monitoring.   She has rosacea acne and says she gets red bumps - she is using a cream and it seems to work well.     Current medications:   Duloxetine 30mg twice daily  Mirtazapine 15mg at bedtime.   Lorazepam 0.5mg - takes quarter to half tablet as needed for anxiety; has not " "taken it in a while.     Past medications:   Lexapro - worked for some time, stopped working after some time.   Lamotrigine - did not work  Luvox - \"awful\"   Pristiq - could not tolerate 100 mg dose.   Sertraline  Fluoxetine  Trintellix - side effects.      Current Medications:    Current Outpatient Medications:     albuterol (ProAir HFA) 90 mcg/actuation inhaler, Inhale., Disp: , Rfl:     clobetasol (Temovate) 0.05 % ointment, Apply topically., Disp: , Rfl:     doxycycline (Adoxa) 100 mg tablet, Take 1 tablet (100 mg) by mouth once daily. Take with a full glass of water and do not lie down for at least 30 minutes after, Disp: , Rfl:     DULoxetine (Cymbalta) 30 mg DR capsule, Take one capsule in the morning and one capsule in the afternoon., Disp: 180 capsule, Rfl: 1    estradiol (Estrace) 0.01 % (0.1 mg/gram) vaginal cream, Insert into the vagina 2 times a week., Disp: , Rfl:     fluticasone (Flonase) 50 mcg/actuation nasal spray, Administer 1 spray into affected nostril(s) once daily., Disp: , Rfl:     levothyroxine (Synthroid) 50 mcg tablet, Take 1 tablet (50 mcg) by mouth once daily., Disp: , Rfl:     LORazepam (Ativan) 0.5 mg tablet, Take 1 tablet (0.5 mg) by mouth., Disp: , Rfl:     magnesium citrate 100 mg tablet, Take 1 tablet by mouth once daily., Disp: , Rfl:     mirtazapine (Remeron) 15 mg tablet, Take 1 tablet (15 mg) by mouth once daily at bedtime., Disp: 90 tablet, Rfl: 1    multivitamin tablet, Take 1 tablet by mouth once daily., Disp: , Rfl:     triamcinolone (Kenalog) 0.1 % oral paste, Take by mouth., Disp: , Rfl:     zinc gluconate-zinc picolinate 30 mg capsule, Take 1 capsule by mouth once daily., Disp: , Rfl:   Medical History:  Past Medical History:   Diagnosis Date    Major depressive disorder, recurrent, unspecified (CMS-HCC) 03/20/2020    Major depression, recurrent    Other specified diseases of blood and blood-forming organs 03/21/2019    Macrocytosis without anemia    Personal history of " malignant neoplasm of ovary     History of ovarian cancer    Personal history of other mental and behavioral disorders     History of major depression    Personal history of other mental and behavioral disorders     History of obsessive compulsive disorder    Personal history of traumatic brain injury     History of concussion     Surgical History:  Past Surgical History:   Procedure Laterality Date    OTHER SURGICAL HISTORY  08/13/2018    Ovarian Cystectomy     Family History:  Family History   Problem Relation Name Age of Onset    Brain cancer Mother      Kidney cancer Father      Prostate cancer Father      Other (bladder cancer) Father      Other (Mood disturbance) Father      Other (Mood disturbance) Sister      Other (Opioid abuse) Sister      Other (Rage attacks) Sister      Autoimmune disease Other Maternal Relatives     Autoimmune disease Other Paternal Relatives      Social History:  Social History     Socioeconomic History    Marital status:      Spouse name: Not on file    Number of children: Not on file    Years of education: Not on file    Highest education level: Not on file   Occupational History    Not on file   Tobacco Use    Smoking status: Never    Smokeless tobacco: Never   Substance and Sexual Activity    Alcohol use: Not on file    Drug use: Never    Sexual activity: Not on file   Other Topics Concern    Not on file   Social History Narrative    Not on file     Social Determinants of Health     Financial Resource Strain: Low Risk  (8/19/2021)    Received from [facility]    Overall Financial Resource Strain (CARDIA)     Difficulty of Paying Living Expenses: Not hard at all   Food Insecurity: No Food Insecurity (1/26/2023)    Received from [facility]    Hunger Vital Sign     Worried About Running Out of Food in the Last Year: Never true     Ran Out of Food in the Last Year: Never true   Transportation Needs: No Transportation Needs (1/26/2023)    Received from [facility]    PRAPARE - Transportation     Lack of Transportation (Medical): No     Lack of Transportation (Non-Medical): No   Physical Activity: Sufficiently Active (11/3/2023)    Received from [facility]    Exercise Vital Sign     Days of Exercise per Week: 4 days     Minutes of Exercise per Session: 50 min   Stress: No Stress Concern Present (1/26/2023)    Received from [facility]    [place] [employer] - Occupational Stress Questionnaire     Feeling of Stress : Not at all   Social Connections: Unknown (1/26/2023)    Received from [facility]    Social Connection and Isolation Panel [NHANES]     Frequency of Communication with Friends and Family: More than three times a week     Frequency of Social Gatherings with Friends and Family: Once a week     Attends Church Services: Patient declined     Active Member of Clubs or Organizations: No     Attends Club or Organization Meetings: Patient declined     Marital Status:    Intimate Partner Violence: Not on file   Housing Stability: Low Risk  (1/26/2023)    Received from [facility]    Housing Stability Vital Sign     Unable to Pay for Housing in the Last Year: No     Number of Places Lived in the Last Year: 1     Unstable Housing in the Last Year: No       Additional historical information includes: Has seen neurosurgery and was 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 She was also told in the past that she has frontal volume loss but was told at recent visit that it was not too significant.     Her father and paternal grandmother were diagnosed with Alzheimer's disease.     Record Review: brief       Psychiatric Review Of Systems:  As above.     Medical Review Of Systems:  Pertinent positives noted in HPI.     Objective   Mental Status Exam:   Appearance: Appropriate grooming .  Behavior/Attitude: Cooperative.   Speech: Regular in rate, tone and volume. No  "pressure.  Mood: \"okay\"  Affect: close to euthymic; not tearful or dysphoric.   Thought process: Goal-directed; organized.   Thought content: No paranoid or delusional content. No hallucinations in auditory, visual or other sensory modalities.   Suicidal ideation: denied.  Homicidal ideation: denied.   Insight: Fair  Judgment: Fair  Recent and remote memory: intact based on recall of recent and remote autobiographical memories.  Attention/concentration: intact during telephone visit.   Language: No aphasia or paraphasic errors.   Fund of knowledge: Average    Vitals:  There were no vitals filed for this visit.    Assessment/Plan   Diagnosis:   Major depressive disorder, recurrent, moderate  Generalized anxiety disorder  Cognitive complaints.     Assessment:   Ms. Nikkie Gomez is a 73-year-old woman with a history of depression, anxiety. Follow up done virtually today.    5/13/24: Reports improved mood; has increased mirtazapine to 15mg at bedtime.     Treatment Plan/Recommendations:   - Continue duloxetine 30mg twice daily for a total of 60mg/day.   - Continue mirtazapine 15mg tab at bedtime.   - May take lorazepam 0.5mg half to one tablet daily for severe anxiety. Monitor carefully for excessive sedation or confusion.   - Supportive therapy provided.   - Recommended using lightbox consistently during the winter months.   - Follow up in about 3 months.      Review with patient: Treatment plan reviewed with the patient.  Medication risks/benefit reviewed with the patient      Lucie Mederos MD   "

## 2024-05-13 NOTE — PATIENT INSTRUCTIONS
Plan:   - Continue duloxetine 30mg twice daily for a total of 60mg/day.   - Continue mirtazapine 15mg tab at bedtime.   - May take lorazepam 0.5mg half to one tablet daily for severe anxiety. Monitor carefully for excessive sedation or confusion.   - Recommended using lightbox consistently during the winter months.   - Follow up in about 3 months.  - Contact via [employer] or call sooner ([phone]) in case of any questions or concerns.  - Call [phone] for mental health crisis or suicidal thoughts, or call 911 or go to the nearest emergency room for emergencies.

## 2024-08-12 ENCOUNTER — APPOINTMENT (OUTPATIENT)
Dept: BEHAVIORAL HEALTH | Facility: CLINIC | Age: 74
End: 2024-08-12
Payer: MEDICARE

## 2024-08-12 DIAGNOSIS — F33.41 RECURRENT MAJOR DEPRESSIVE DISORDER, IN PARTIAL REMISSION (CMS-HCC): ICD-10-CM

## 2024-08-12 DIAGNOSIS — F41.9 ANXIETY: ICD-10-CM

## 2024-08-12 PROCEDURE — 99213 OFFICE O/P EST LOW 20 MIN: CPT | Performed by: PSYCHIATRY & NEUROLOGY

## 2024-08-12 PROCEDURE — 1160F RVW MEDS BY RX/DR IN RCRD: CPT | Performed by: PSYCHIATRY & NEUROLOGY

## 2024-08-12 PROCEDURE — 1159F MED LIST DOCD IN RCRD: CPT | Performed by: PSYCHIATRY & NEUROLOGY

## 2024-08-12 RX ORDER — DULOXETIN HYDROCHLORIDE 30 MG/1
CAPSULE, DELAYED RELEASE ORAL
Qty: 180 CAPSULE | Refills: 1 | Status: SHIPPED | OUTPATIENT
Start: 2024-08-12

## 2024-08-12 RX ORDER — THYROID 60 MG/1
60 TABLET ORAL
COMMUNITY

## 2024-08-12 NOTE — PATIENT INSTRUCTIONS
Plan:   - Continue duloxetine 30mg twice daily for a total of 60mg/day.   - Continue mirtazapine 15mg tab at bedtime.   - May take lorazepam 0.5mg half to one tablet daily for severe anxiety. Monitor carefully for excessive sedation or confusion.   - Supportive therapy provided.   - Follow up in about 3 months.  - Contact via [employer] or call sooner ([phone]) in case of any questions or concerns.  - Call [phone] for mental health crisis or suicidal thoughts, or call 911 or go to the nearest emergency room for emergencies.

## 2024-08-12 NOTE — PROGRESS NOTES
"Outpatient Psychiatry      Reason for Visit:   Follow up for depression, anxiety    Subjective   MsJillian Gomez is a 74-year-old woman with a history of depression, anxiety. Follow up done virtually today. I performed this visit using real-time telehealth tools, including an audio/video connection with patient (at home).     She says she is \"good.\"     She says there have been some \"changes.\" She says she knew that levothyroxine was not helping her. She started seeing a holistic doctor - her vitamin D level was found to be low but it has been corrected; she also has received \"Resilience\" infusions, which she says contains nutrients and multi-vitamins. She says she stopped taking Levothyroxine and is using NP Thyroid 60mg daily. She says the doctor questioned why she is on mirtazapine.     She says she feels \"more sociable.\"   Denies anhedonia.   Appetite - says she used to have low appetite but it is improved now. She feels she is at a good weight.   Has good energy. She goes to the fitness center every other day.   Fair concentration.   Denies any death wishes or suicidal thoughts, intent or plans.     She says her anxiety is stable; has not used lorazepam for a while. Used quarter tablet twice a while ago.     She has baseline difficulties with word-finding.   No problems driving. Uses a navigation marjan on her phone when driving.   Manages finances independently.   Takes medications on her own; uses alarm on her phone.     She says she is able to talk to her sister; does not want to see a psychotherapist.     Current medications:   Duloxetine 30mg twice daily  Mirtazapine 15mg at bedtime. Says she occasionally gets muscle twitching but otherwise tolerating it.  Lorazepam 0.5mg - takes quarter to half tablet as needed for anxiety; has not taken it in a while.     Past medications:   Lexapro - worked for some time, stopped working after some time.   Lamotrigine - did not work  Luvox - \"awful\"   Pristiq - could not " tolerate 100 mg dose.   Sertraline  Fluoxetine  Trintellix - side effects.    Current Medications:    Current Outpatient Medications:     albuterol (ProAir HFA) 90 mcg/actuation inhaler, Inhale., Disp: , Rfl:     clobetasol (Temovate) 0.05 % ointment, Apply topically., Disp: , Rfl:     doxycycline (Adoxa) 100 mg tablet, Take 1 tablet (100 mg) by mouth once daily. Take with a full glass of water and do not lie down for at least 30 minutes after, Disp: , Rfl:     DULoxetine (Cymbalta) 30 mg DR capsule, Take one capsule in the morning and one capsule in the afternoon., Disp: 180 capsule, Rfl: 1    estradiol (Estrace) 0.01 % (0.1 mg/gram) vaginal cream, Insert into the vagina 2 times a week., Disp: , Rfl:     fluticasone (Flonase) 50 mcg/actuation nasal spray, Administer 1 spray into affected nostril(s) once daily., Disp: , Rfl:     levothyroxine (Synthroid) 50 mcg tablet, Take 1 tablet (50 mcg) by mouth once daily., Disp: , Rfl:     LORazepam (Ativan) 0.5 mg tablet, Take 1 tablet (0.5 mg) by mouth., Disp: , Rfl:     magnesium citrate 100 mg tablet, Take 1 tablet by mouth once daily., Disp: , Rfl:     mirtazapine (Remeron) 15 mg tablet, Take 1 tablet (15 mg) by mouth once daily at bedtime., Disp: 90 tablet, Rfl: 1    multivitamin tablet, Take 1 tablet by mouth once daily., Disp: , Rfl:     triamcinolone (Kenalog) 0.1 % oral paste, Take by mouth., Disp: , Rfl:     zinc gluconate-zinc picolinate 30 mg capsule, Take 1 capsule by mouth once daily., Disp: , Rfl:   Medical History:  Past Medical History:   Diagnosis Date    Major depressive disorder, recurrent, unspecified (CMS-HCC) 03/20/2020    Major depression, recurrent    Other specified diseases of blood and blood-forming organs 03/21/2019    Macrocytosis without anemia    Personal history of malignant neoplasm of ovary     History of ovarian cancer    Personal history of other mental and behavioral disorders     History of major depression    Personal history of other  mental and behavioral disorders     History of obsessive compulsive disorder    Personal history of traumatic brain injury     History of concussion     Surgical History:  Past Surgical History:   Procedure Laterality Date    OTHER SURGICAL HISTORY  08/13/2018    Ovarian Cystectomy     Family History:  Family History   Problem Relation Name Age of Onset    Brain cancer Mother      Kidney cancer Father      Prostate cancer Father      Other (bladder cancer) Father      Other (Mood disturbance) Father      Other (Mood disturbance) Sister      Other (Opioid abuse) Sister      Other (Rage attacks) Sister      Autoimmune disease Other Maternal Relatives     Autoimmune disease Other Paternal Relatives      Social History:  Social History     Socioeconomic History    Marital status:      Spouse name: Not on file    Number of children: Not on file    Years of education: Not on file    Highest education level: Not on file   Occupational History    Not on file   Tobacco Use    Smoking status: Never    Smokeless tobacco: Never   Substance and Sexual Activity    Alcohol use: Not on file    Drug use: Never    Sexual activity: Not on file   Other Topics Concern    Not on file   Social History Narrative    Not on file     Social Determinants of Health     Financial Resource Strain: Low Risk  (8/19/2021)    Received from [facility]    Overall Financial Resource Strain (CARDIA)     Difficulty of Paying Living Expenses: Not hard at all   Food Insecurity: No Food Insecurity (1/26/2023)    Received from [facility]    Hunger Vital Sign     Worried About Running Out of Food in the Last Year: Never true     Ran Out of Food in the Last Year: Never true   Transportation Needs: No Transportation Needs (1/26/2023)    Received from [facility]    PRAPARE - Transportation     Lack of Transportation (Medical): No     Lack of Transportation (Non-Medical): No   Physical Activity: Sufficiently Active (11/3/2023)    Received  "from [facility]    Exercise Vital Sign     Days of Exercise per Week: 4 days     Minutes of Exercise per Session: 50 min   Stress: No Stress Concern Present (1/26/2023)    Received from [facility]    [place] [employer] - Occupational Stress Questionnaire     Feeling of Stress : Not at all   Social Connections: Unknown (1/26/2023)    Received from [facility]    Social Connection and Isolation Panel [NHANES]     Frequency of Communication with Friends and Family: More than three times a week     Frequency of Social Gatherings with Friends and Family: Once a week     Attends [employer] Services: Patient declined     Active Member of Clubs or Organizations: No     Attends Club or Organization Meetings: Patient declined     Marital Status:    Intimate Partner Violence: Not on file   Housing Stability: Low Risk  (1/26/2023)    Received from [facility]    Housing Stability Vital Sign     Unable to Pay for Housing in the Last Year: No     Number of Places Lived in the Last Year: 1     In the last 12 months, was there a time when you did not have a steady place to sleep or slept in a shelter (including now)?: No       Additional historical information includes: Has seen neurosurgery and was 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 She was also told in the past that she has frontal volume loss but was told at recent visit that it was not too significant.     Her father and paternal grandmother were diagnosed with Alzheimer's disease.     Record Review: brief       Psychiatric Review Of Systems:  As above.     Medical Review Of Systems:  Pertinent positives noted in HPI.     Objective   Mental Status Exam:   Appearance: Appropriate grooming .  Behavior/Attitude: Cooperative.   Speech: Regular in rate, tone and volume. No pressure.  Mood: \"okay\"  Affect: close to euthymic.   Thought process: Goal-directed; organized. "   Thought content: No paranoid or delusional content. No hallucinations in auditory, visual or other sensory modalities.   Suicidal ideation: denied.  Homicidal ideation: denied.   Insight: Fair  Judgment: Fair  Recent and remote memory: intact based on recall of recent and remote autobiographical memories.  Attention/concentration: intact during telephone visit.   Language: No aphasia or paraphasic errors.   Fund of knowledge: Average    Vitals:  There were no vitals filed for this visit.    Assessment/Plan   Diagnosis:   Major depressive disorder, recurrent, moderate  Generalized anxiety disorder  Cognitive complaints.     Assessment:   Ms. Nikkie Gomez is a 74-year-old woman with a history of depression, anxiety. Follow up done virtually today.    8/12/24: Reports seeing holistic medicine treatment. Reports feeling better physically and improved mood.     Treatment Plan/Recommendations:   - Continue duloxetine 30mg twice daily for a total of 60mg/day.   - Continue mirtazapine 15mg tab at bedtime. Discussed that we can consider lowering the dose gradually in the future if her mood and anxiety are well-controlled.       - May take lorazepam 0.5mg half to one tablet daily for severe anxiety. Monitor carefully for excessive sedation or confusion.  - Supportive therapy provided.   - Follow up in about 3 months.  - Contact sooner if needed.       Review with patient: Treatment plan reviewed with the patient.  Medication risks/benefit reviewed with the patient      Lucie Mederos MD

## 2024-11-11 ENCOUNTER — APPOINTMENT (OUTPATIENT)
Dept: BEHAVIORAL HEALTH | Facility: CLINIC | Age: 74
End: 2024-11-11
Payer: MEDICARE

## 2024-11-11 DIAGNOSIS — F33.41 RECURRENT MAJOR DEPRESSIVE DISORDER, IN PARTIAL REMISSION (CMS-HCC): ICD-10-CM

## 2024-11-11 DIAGNOSIS — F41.9 ANXIETY: ICD-10-CM

## 2024-11-11 PROCEDURE — 1159F MED LIST DOCD IN RCRD: CPT | Performed by: PSYCHIATRY & NEUROLOGY

## 2024-11-11 PROCEDURE — 99213 OFFICE O/P EST LOW 20 MIN: CPT | Performed by: PSYCHIATRY & NEUROLOGY

## 2024-11-11 PROCEDURE — 1160F RVW MEDS BY RX/DR IN RCRD: CPT | Performed by: PSYCHIATRY & NEUROLOGY

## 2024-11-11 RX ORDER — THYROID 15 MG/1
15 TABLET ORAL DAILY
COMMUNITY

## 2024-11-11 NOTE — PROGRESS NOTES
"Outpatient Psychiatry    Ms. Nikkie Gomez is a 74-year-old woman with a history of depression, anxiety. Follow up done virtually today.     Virtual or Telephone Consent    An interactive audio and video telecommunication system which permits real time communications between the patient (at the originating site) and provider (at the distant site) was utilized to provide this telehealth service.   Verbal consent was requested and obtained from Nikkie Gomez on this date, 11/11/24 for a telehealth visit.     Reason for Visit:  Follow up for depression, anxiety    Subjective     She says she is \"doing well.\"     She says she was feeling shaky so her Armor thyroid was decreased to 45mg.     She says she goes to the [employer] to use the fitness center. She says there are other activities there but has not started going for activities. She talks to her neighbors.   She says she loves to read but is not a fast reader; says she used to have problems with comprehension.   She sees her sister and talks to people over the phone.   Denies anhedonia.   Appetite - okay. She feels she is at a good weight.   Has good energy.   Fair concentration.   Denies any death wishes or suicidal thoughts, intent or plans.     Anxiety is stable; has not used lorazepam for a while.     She says that when she has rosacea acne and does not want to go out. She uses creams.     She uses a light box during the winter months.     Denies any change in cognition or functioning.   She has baseline difficulties with word-finding.   No problems driving. Uses a navigation marjan on her phone when driving.   Manages finances independently.   Takes medications on her own; uses alarm on her phone.     She talks to her sister every day. She does not want to see a psychotherapist.     Current medications:   Duloxetine 30mg twice daily  Mirtazapine 15mg at bedtime. (History of occasional muscle twitching but otherwise tolerating it)  Lorazepam 0.5mg - takes quarter " "to half tablet as needed for anxiety; has not needed it recently     Past medications:   Lexapro - worked for some time, stopped working after some time.   Lamotrigine - did not work  Luvox - \"awful\"   Pristiq - could not tolerate 100 mg dose.   Sertraline  Fluoxetine  Trintellix - side effects.    Current Medications:    Current Outpatient Medications:     albuterol (ProAir HFA) 90 mcg/actuation inhaler, Inhale., Disp: , Rfl:     clobetasol (Temovate) 0.05 % ointment, Apply topically., Disp: , Rfl:     doxycycline (Adoxa) 100 mg tablet, Take 1 tablet (100 mg) by mouth once daily. Take with a full glass of water and do not lie down for at least 30 minutes after, Disp: , Rfl:     DULoxetine (Cymbalta) 30 mg DR capsule, Take one capsule in the morning and one capsule in the afternoon., Disp: 180 capsule, Rfl: 1    estradiol (Estrace) 0.01 % (0.1 mg/gram) vaginal cream, Insert into the vagina 2 times a week., Disp: , Rfl:     fluticasone (Flonase) 50 mcg/actuation nasal spray, Administer 1 spray into affected nostril(s) once daily., Disp: , Rfl:     LORazepam (Ativan) 0.5 mg tablet, Take 1 tablet (0.5 mg) by mouth., Disp: , Rfl:     magnesium citrate 100 mg tablet, Take 1 tablet by mouth once daily., Disp: , Rfl:     mirtazapine (Remeron) 15 mg tablet, Take 1 tablet (15 mg) by mouth once daily at bedtime., Disp: 90 tablet, Rfl: 1    multivitamin tablet, Take 1 tablet by mouth once daily., Disp: , Rfl:     thyroid, pork, (NP Thyroid) 60 mg tablet, Take 1 tablet (60 mg) by mouth once daily in the morning. Take before meals., Disp: , Rfl:     triamcinolone (Kenalog) 0.1 % oral paste, Take by mouth., Disp: , Rfl:     zinc gluconate-zinc picolinate 30 mg capsule, Take 1 capsule by mouth once daily., Disp: , Rfl:   Medical History:  Past Medical History:   Diagnosis Date    Major depressive disorder, recurrent, unspecified (CMS-HCC) 03/20/2020    Major depression, recurrent    Other specified diseases of blood and " blood-forming organs 03/21/2019    Macrocytosis without anemia    Personal history of malignant neoplasm of ovary     History of ovarian cancer    Personal history of other mental and behavioral disorders     History of major depression    Personal history of other mental and behavioral disorders     History of obsessive compulsive disorder    Personal history of traumatic brain injury     History of concussion     Surgical History:  Past Surgical History:   Procedure Laterality Date    OTHER SURGICAL HISTORY  08/13/2018    Ovarian Cystectomy     Family History:  Family History   Problem Relation Name Age of Onset    Brain cancer Mother      Kidney cancer Father      Prostate cancer Father      Other (bladder cancer) Father      Other (Mood disturbance) Father      Other (Mood disturbance) Sister      Other (Opioid abuse) Sister      Other (Rage attacks) Sister      Autoimmune disease Other Maternal Relatives     Autoimmune disease Other Paternal Relatives      Social History:  Social History     Socioeconomic History    Marital status:      Spouse name: Not on file    Number of children: Not on file    Years of education: Not on file    Highest education level: Not on file   Occupational History    Not on file   Tobacco Use    Smoking status: Never    Smokeless tobacco: Never   Substance and Sexual Activity    Alcohol use: Not on file    Drug use: Never    Sexual activity: Not on file   Other Topics Concern    Not on file   Social History Narrative    Not on file     Social Drivers of Health     Financial Resource Strain: Low Risk  (7/1/2024)    Received from [facility]    Overall Financial Resource Strain (CARDIA)     Difficulty of Paying Living Expenses: Not hard at all   Food Insecurity: No Food Insecurity (7/1/2024)    Received from [facility]    Hunger Vital Sign     Worried About Running Out of Food in the Last Year: Never true     Ran Out of Food in the Last Year: Never true    Transportation Needs: No Transportation Needs (7/1/2024)    Received from [facility]    PRAPARE - Transportation     Lack of Transportation (Medical): No     Lack of Transportation (Non-Medical): No   Physical Activity: Sufficiently Active (7/1/2024)    Received from [facility]    Exercise Vital Sign     Days of Exercise per Week: 4 days     Minutes of Exercise per Session: 60 min   Stress: No Stress Concern Present (7/1/2024)    Received from [facility]    [employer] - Occupational Stress Questionnaire     Feeling of Stress : Not at all   Social Connections: Unknown (7/1/2024)    Received from [facility]    Social Connection and Isolation Panel [NHANES]     Frequency of Communication with Friends and Family: More than three times a week     Frequency of Social Gatherings with Friends and Family: Twice a week     Attends Jehovah's witness Services: Patient declined     Active Member of Clubs or Organizations: Yes     Attends Club or Organization Meetings: More than 4 times per year     Marital Status:    Intimate Partner Violence: Not on file   Housing Stability: Low Risk  (1/26/2023)    Received from [facility]    Housing Stability Vital Sign     Unable to Pay for Housing in the Last Year: No     Number of Places Lived in the Last Year: 1     In the last 12 months, was there a time when you did not have a steady place to sleep or slept in a shelter (including now)?: No       Additional historical information includes: Has seen neurosurgery and was 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 She was also told in the past that she has frontal volume loss but was told at recent visit that it was not too significant.     Her father and paternal grandmother were diagnosed with Alzheimer's disease.     Record Review: brief       Psychiatric Review Of Systems:  As above.     Medical Review Of Systems:  Pertinent  "positives noted in HPI.     Objective   Mental Status Exam:   Appearance: Appropriate grooming .  Behavior/Attitude: Cooperative.   Speech: Regular in rate, tone and volume. No pressure.  Mood: \"doing well\"  Affect: close to euthymic.   Thought process: Goal-directed; organized.   Thought content: No paranoid or delusional content. No hallucinations in auditory, visual or other sensory modalities.   Suicidal ideation: denied.  Homicidal ideation: denied.   Insight: Fair  Judgment: Fair  Recent and remote memory: intact based on recall of recent and remote autobiographical memories.  Attention/concentration: intact during telephone visit.   Language: No aphasia or paraphasic errors. Occasional word-finding difficulties.   Fund of knowledge: Average    Vitals:  There were no vitals filed for this visit.    Assessment/Plan   Diagnosis:   Major depressive disorder, recurrent, moderate  Generalized anxiety disorder  Cognitive complaints.     Assessment:   Ms. Nikkie Gomez is a 74-year-old woman with a history of depression, anxiety. Follow up done virtually today.    11/11/24: Mood and anxiety have been stable.     Treatment Plan/Recommendations:   - Continue duloxetine 30mg twice daily for a total of 60mg/day.   - Continue mirtazapine 15mg tab at bedtime.      - May take lorazepam 0.5mg half to one tablet daily for severe anxiety. Monitor carefully for excessive sedation or confusion.      - Recommend using the light-box regularly during fall/winter months.   - Supportive therapy provided.   - Follow up in about 3 months.  - Contact sooner if needed.       Review with patient: Treatment plan reviewed with the patient.  Medication risks/benefit reviewed with the patient      Lucie Mederos MD   "

## 2024-11-11 NOTE — PATIENT INSTRUCTIONS
Plan:   - Continue duloxetine 30mg twice daily for a total of 60mg/day.   - Continue mirtazapine 15mg tab at bedtime.      - May take lorazepam 0.5mg half to one tablet daily for severe anxiety. Monitor carefully for excessive sedation or confusion if you do need to use it.       - Recommend using the lightbox regularly during fall/winter months.   - Follow up in about 3 months.  - Contact via [employer] or call sooner ([phone]) in case of any questions or concerns.  - Call [phone] for mental health crisis or suicidal thoughts, or call 911 or go to the nearest emergency room for emergencies.

## 2024-11-15 DIAGNOSIS — F41.8 DEPRESSION WITH ANXIETY: ICD-10-CM

## 2024-11-15 RX ORDER — MIRTAZAPINE 15 MG/1
15 TABLET, FILM COATED ORAL NIGHTLY
Qty: 90 TABLET | Refills: 1 | Status: SHIPPED | OUTPATIENT
Start: 2024-11-15

## 2025-02-17 ENCOUNTER — TELEMEDICINE (OUTPATIENT)
Dept: BEHAVIORAL HEALTH | Facility: CLINIC | Age: 75
End: 2025-02-17
Payer: MEDICARE

## 2025-02-17 ENCOUNTER — APPOINTMENT (OUTPATIENT)
Dept: BEHAVIORAL HEALTH | Facility: CLINIC | Age: 75
End: 2025-02-17
Payer: MEDICARE

## 2025-02-17 DIAGNOSIS — F33.41 RECURRENT MAJOR DEPRESSIVE DISORDER, IN PARTIAL REMISSION (CMS-HCC): ICD-10-CM

## 2025-02-17 DIAGNOSIS — F41.9 ANXIETY: ICD-10-CM

## 2025-02-17 PROCEDURE — 1159F MED LIST DOCD IN RCRD: CPT | Performed by: PSYCHIATRY & NEUROLOGY

## 2025-02-17 PROCEDURE — 99213 OFFICE O/P EST LOW 20 MIN: CPT | Performed by: PSYCHIATRY & NEUROLOGY

## 2025-02-17 PROCEDURE — 1160F RVW MEDS BY RX/DR IN RCRD: CPT | Performed by: PSYCHIATRY & NEUROLOGY

## 2025-02-17 RX ORDER — DULOXETIN HYDROCHLORIDE 30 MG/1
CAPSULE, DELAYED RELEASE ORAL
Qty: 180 CAPSULE | Refills: 1 | Status: SHIPPED | OUTPATIENT
Start: 2025-02-17

## 2025-02-17 NOTE — PATIENT INSTRUCTIONS
Plan:   - Continue duloxetine 30mg twice daily for a total of 60mg/day.   - Continue mirtazapine about 10mg (two-thirds of 15mg) tab at bedtime.      - May take lorazepam 0.5mg half to one tablet daily for severe anxiety. Monitor carefully for excessive sedation or confusion.      - Use the light-box regularly during fall/winter months.   - Follow up in about 3 months. Call [phone] to schedule.   - Contact via [employer] or call sooner ([phone]) in case of any questions or concerns.  - Call [phone] for mental health crisis or suicidal thoughts, or call 911 or go to the nearest emergency room for emergencies.    Brain-healthy lifestyle:   - Make sure your medical conditions (if any) such as diabetes, high blood pressure, high cholesterol, thyroid disease sleep apnea are optimally controlled.   - Use eyeglasses or hearing aids appropriately if needed.   - Eat a heart healthy diet (like a Mediterranean diet; lots of fruits and vegetables, fish; low fat)  - Exercise regularly as tolerated.   - Maintain good sleep hygiene; avoid daytime naps; try to get 7 to 8 hours of continuous sleep at night.   - Stay mentally active - puzzles, word searches, books, playing cards.  - Stay socially active and engaged.

## 2025-02-17 NOTE — PROGRESS NOTES
"Outpatient Psychiatry    Ms. Nikkie Gomez is a 74-year-old woman with a history of depression, anxiety. Follow up done virtually today.     Virtual or Telephone Consent    An interactive audio and video telecommunication system which permits real time communications between the patient (at the originating site) and provider (at the distant site) was utilized to provide this telehealth service.   Verbal consent was requested and obtained from Nikkie Gomez on this date, 02/17/25 for a telehealth visit.     Reason for Visit:  Follow up for depression, anxiety    Subjective     She says she is \"doing fine.\"     She says things are going okay. She says she has not been having any down or depressed modo. She says she is thankful.     She says she goes to the [employer] [place] to use the fitness center.     Denies anhedonia.   Appetite - okay. She says she gained about 7 lbs during the holidays, and is working on it. She goes to the fitness center.   Sleep - good.   Has good energy.   Fair concentration.   Denies any death wishes or suicidal thoughts, intent or plans.     She says she started to experience restlessness in her arms again, and decreased mirtazapine to two-thirds of 15mg; this seems to have helped reduce the restlessness without any worsening of sleep.     Anxiety has been \"okay.\" She says has not needed to use lorazepam in a while.     She uses a light box during the winter months.     Denies any change in cognition or functioning. She says she occasionally has trouble coming up with a name.   She says she seems to do well when the day is bright. She uses bright lights in the evening.   No problems driving. Uses a navigation marjan on her phone when driving.   Manages finances independently.   Takes medications on her own; sets alarm.     She says she is seeing a holistic doctor regularly; gets supplements. She says she finds it helpful.     She talks to her sister regularly. She does not want to see a " "psychotherapist.     She says she is going to [place] to see her sons in March.     Current medications:   Duloxetine 30mg twice daily  Mirtazapine at bedtime - taking 2/3rd of 15mg. (History of occasional muscle twitching but otherwise tolerating it)  Lorazepam 0.5mg - takes quarter to half tablet as needed for anxiety; has not needed it for a long time.     Past medications:   Lexapro - worked for some time, stopped working after some time.   Lamotrigine - did not work  Luvox - \"awful\"   Pristiq - could not tolerate 100 mg dose.   Sertraline  Fluoxetine  Trintellix - side effects.    Current Medications:    Current Outpatient Medications:     albuterol (ProAir HFA) 90 mcg/actuation inhaler, Inhale., Disp: , Rfl:     clobetasol (Temovate) 0.05 % ointment, Apply topically., Disp: , Rfl:     DULoxetine (Cymbalta) 30 mg DR capsule, Take one capsule in the morning and one capsule in the afternoon., Disp: 180 capsule, Rfl: 1    estradiol (Estrace) 0.01 % (0.1 mg/gram) vaginal cream, Insert into the vagina 2 times a week., Disp: , Rfl:     fluticasone (Flonase) 50 mcg/actuation nasal spray, Administer 1 spray into affected nostril(s) once daily., Disp: , Rfl:     LORazepam (Ativan) 0.5 mg tablet, Take 1 tablet (0.5 mg) by mouth., Disp: , Rfl:     magnesium citrate 100 mg tablet, Take 1 tablet by mouth once daily., Disp: , Rfl:     mirtazapine (Remeron) 15 mg tablet, TAKE 1 TABLET BY MOUTH AT BEDTIME, Disp: 90 tablet, Rfl: 1    multivitamin tablet, Take 1 tablet by mouth once daily., Disp: , Rfl:     thyroid, pork, ([place]) 30 mg tablet, Take 1 tablet (30 mg) by mouth once daily in the morning. Take before meals., Disp: , Rfl:     thyroid, pork, 15 mg tablet, Take 1 tablet (15 mg) by mouth once daily., Disp: , Rfl:     triamcinolone (Kenalog) 0.1 % oral paste, Take by mouth., Disp: , Rfl:     zinc gluconate-zinc picolinate 30 mg capsule, Take 1 capsule by mouth once daily., Disp: , Rfl:   Medical History:  Past Medical " History:   Diagnosis Date    Major depressive disorder, recurrent, unspecified (CMS-HCC) 03/20/2020    Major depression, recurrent    Other specified diseases of blood and blood-forming organs 03/21/2019    Macrocytosis without anemia    Personal history of malignant neoplasm of ovary     History of ovarian cancer    Personal history of other mental and behavioral disorders     History of major depression    Personal history of other mental and behavioral disorders     History of obsessive compulsive disorder    Personal history of traumatic brain injury     History of concussion     Surgical History:  Past Surgical History:   Procedure Laterality Date    OTHER SURGICAL HISTORY  08/13/2018    Ovarian Cystectomy     Family History:  Family History   Problem Relation Name Age of Onset    Brain cancer Mother      Kidney cancer Father      Prostate cancer Father      Other (bladder cancer) Father      Other (Mood disturbance) Father      Other (Mood disturbance) Sister      Other (Opioid abuse) Sister      Other (Rage attacks) Sister      Autoimmune disease Other Maternal Relatives     Autoimmune disease Other Paternal Relatives      Social History:  Social History     Socioeconomic History    Marital status:      Spouse name: Not on file    Number of children: Not on file    Years of education: Not on file    Highest education level: Not on file   Occupational History    Not on file   Tobacco Use    Smoking status: Never    Smokeless tobacco: Never   Substance and Sexual Activity    Alcohol use: Not on file    Drug use: Never    Sexual activity: Not on file   Other Topics Concern    Not on file   Social History Narrative    Not on file     Social Drivers of Health     Financial Resource Strain: Low Risk  (7/1/2024)    Received from [facility]    Overall Financial Resource Strain (CARDIA)     Difficulty of Paying Living Expenses: Not hard at all   Food Insecurity: No Food Insecurity (7/1/2024)    Received  from [facility]    Hunger Vital Sign     Worried About Running Out of Food in the Last Year: Never true     Ran Out of Food in the Last Year: Never true   Transportation Needs: No Transportation Needs (7/1/2024)    Received from [facility]    PRAPARE - Transportation     Lack of Transportation (Medical): No     Lack of Transportation (Non-Medical): No   Physical Activity: Sufficiently Active (7/1/2024)    Received from [facility]    Exercise Vital Sign     Days of Exercise per Week: 4 days     Minutes of Exercise per Session: 60 min   Stress: No Stress Concern Present (7/1/2024)    Received from [facility]    [employer] - Occupational Stress Questionnaire     Feeling of Stress : Not at all   Social Connections: Unknown (7/1/2024)    Received from [facility]    Social Connection and Isolation Panel [NHANES]     Frequency of Communication with Friends and Family: More than three times a week     Frequency of Social Gatherings with Friends and Family: Twice a week     Attends [employer] Services: Patient declined     Active Member of Clubs or Organizations: Yes     Attends Club or Organization Meetings: More than 4 times per year     Marital Status:    Intimate Partner Violence: Not on file   Housing Stability: Low Risk  (1/26/2023)    Received from [facility]    Housing Stability Vital Sign     Unable to Pay for Housing in the Last Year: No     Number of Places Lived in the Last Year: 1     In the last 12 months, was there a time when you did not have a steady place to sleep or slept in a shelter (including now)?: No       Additional historical information includes: Has seen neurosurgery and was 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 She was also told in the past that she has frontal volume loss but was told at recent visit that it was not too significant.     Her father and paternal  "grandmother were diagnosed with Alzheimer's disease.     Record Review: brief     Psychiatric Review Of Systems:  As above.     Medical Review Of Systems:  Pertinent positives noted in HPI.     Objective   Mental Status Exam:   Appearance: Appropriate grooming .  Behavior/Attitude: Cooperative.   Speech: Regular in rate, tone and volume. No pressure.  Mood: \"good\"  Affect: close to euthymic.   Thought process: Goal-directed; organized.   Thought content: No paranoid or delusional content. No hallucinations in auditory, visual or other sensory modalities.   Suicidal ideation: denied.  Homicidal ideation: denied.   Insight: Fair  Judgment: Fair  Recent and remote memory: intact based on recall of recent and remote autobiographical memories.  Attention/concentration: intact during telephone visit.   Language: No aphasia or paraphasic errors. No significant word-finding difficulties.   Fund of knowledge: Average    Vitals:  There were no vitals filed for this visit.    Assessment/Plan   Diagnosis:   Major depressive disorder, recurrent, moderate  Generalized anxiety disorder  Cognitive complaints.     Assessment:   Ms. Nikkie Gomez is a 74-year-old woman with a history of depression, anxiety. Follow up done virtually today.    2/17/25: Mood and anxiety have been stable.     Treatment Plan/Recommendations:   - Continue duloxetine 30mg twice daily for a total of 60mg/day.   - Continue mirtazapine about 10mg (two-thirds of 15mg) tab at bedtime.      - May take lorazepam 0.5mg half to one tablet daily for severe anxiety. Monitor carefully for excessive sedation or confusion.      - Use the light-box regularly during fall/winter months.   - Supportive therapy provided.   - Follow up in about 3 months.  - Contact sooner if needed.       Review with patient: Treatment plan reviewed with the patient.  Medication risks/benefit reviewed with the patient      Lucie Mederos MD   "

## 2025-04-12 ENCOUNTER — HOSPITAL ENCOUNTER (OUTPATIENT)
Dept: RADIOLOGY | Facility: CLINIC | Age: 75
Discharge: HOME | End: 2025-04-12
Payer: MEDICARE

## 2025-04-12 DIAGNOSIS — Z13.6 ENCOUNTER FOR SCREENING FOR CARDIOVASCULAR DISORDERS: ICD-10-CM

## 2025-04-12 PROCEDURE — 75571 CT HRT W/O DYE W/CA TEST: CPT

## 2025-04-28 ENCOUNTER — TELEPHONE (OUTPATIENT)
Dept: PHYSICAL THERAPY | Facility: HOSPITAL | Age: 75
End: 2025-04-28
Payer: MEDICARE

## 2025-04-28 NOTE — TELEPHONE ENCOUNTER
PT SCHED IE 05/05 & PROVIDERS HAS OPENING AVAIL THIS DATE (04/28) RANGING FROM 9:15A TO 12:45P. LMOM TO ADV OF OPENINGS AVAIL AND ASKED TO CALL BACK IF INTERESTED IN STARTING PT EARLIER DATE AND KEEPING 05/05 FOR F/U VISIT VS START DATE    ---NOTE--WILL ALSO NEED TO SCHED MORE F/U PF DATES; PT ONLY SCHED FOR IE. 2ND ATTEMPT TO REACH REFERRING PROVIDER-HAYLEY LMOM TO FAX OVER REFERRAL FOR PELVIC FLOOR SCHED

## 2025-05-05 ENCOUNTER — EVALUATION (OUTPATIENT)
Dept: PHYSICAL THERAPY | Facility: HOSPITAL | Age: 75
End: 2025-05-05
Payer: MEDICARE

## 2025-05-05 DIAGNOSIS — M25.551 BILATERAL HIP PAIN: Primary | ICD-10-CM

## 2025-05-05 DIAGNOSIS — N39.46 MIXED STRESS AND URGE URINARY INCONTINENCE: ICD-10-CM

## 2025-05-05 DIAGNOSIS — M25.552 BILATERAL HIP PAIN: Primary | ICD-10-CM

## 2025-05-05 PROCEDURE — 97161 PT EVAL LOW COMPLEX 20 MIN: CPT | Mod: GP

## 2025-05-05 PROCEDURE — 97110 THERAPEUTIC EXERCISES: CPT | Mod: GP

## 2025-05-05 ASSESSMENT — ENCOUNTER SYMPTOMS
OCCASIONAL FEELINGS OF UNSTEADINESS: 0
LOSS OF SENSATION IN FEET: 0
DEPRESSION: 0

## 2025-05-05 ASSESSMENT — PAIN SCALES - GENERAL: PAINLEVEL_OUTOF10: 0 - NO PAIN

## 2025-05-05 ASSESSMENT — PAIN - FUNCTIONAL ASSESSMENT: PAIN_FUNCTIONAL_ASSESSMENT: 0-10

## 2025-05-05 NOTE — PROGRESS NOTES
Physical Therapy Evaluation and Treatment      Patient Name: Nikkie Gomez  MRN: [id number]  Today's Date: 5/5/2025    Time Entry:                           INS MEDICARE/ AARP                           COPAY 0   2410($0 USED )  (MET) COVERAGE 80/100 OOP 0    AARP TO FOLLOW MEDICARE  NO AUTH REQ         MCR CERT PERIOD: 5/5/25-8/3/25   Visit #1    Assessment:    Nikkie Gomez is a 74 y.o.  female presenting with c/o urinary incontinence and concerns for POP and intermittent hip pain. Upon examination patient demonstrates decreased hip and lumbar ROM, decreased hip and core strength, impaired pressure management techniques, and mild tenderness in B hips . Functional limitations and participations restrictions include walking on cement, coughing, sneezing, laughing, bending, lifting, walking to bathroom with urge and getting out of bed.  The clinical presentation of this patient is stable and their history and examination findings are consistent with a low complexity evaluation with good rehab potential. Pt will benefit from skilled PT intervention 1 x/week for 6-8 weeks to address limitations listed above and achieve desired goals.      Plan:  OP PT Plan  Treatment/Interventions: Biofeedback, Cryotherapy, Dry needling, Education/ Instruction, Electrical stimulation, Gait training, Hot pack, Manual therapy, Neuromuscular re-education, Self care/ home management, Taping techniques, Therapeutic activities, Therapeutic exercises  PT Plan: Skilled PT  PT Frequency: 1 time per week  Duration: 6-8 weeks  Certification Period Start Date: 05/05/25  Certification Period End Date: 08/03/25  Number of Treatments Authorized: MEDICARE/ AARP                           COPAY 0   2410($0 USED )  (MET) COVERAGE 80/100 OOP 0    AARP TO FOLLOW MEDICARE  NO AUTH REQ  Rehab Potential: Good  Plan of Care Agreement: Patient    Current Problem:   1. Bilateral hip pain  Follow Up In Physical Therapy      2. Mixed stress and urge urinary  incontinence  Follow Up In Physical Therapy          Subjective    General:  NOLVIA   Per chart review: hx of Hereditary leiomyoma and renal cell carcinoma with hx of microhematuria most likely associated with radiation cystitis due to ovarian ca s/p resection (1974) and XRT and leiomyomas   General  Reason for Referral: PFPT    Had ovarian CA at age 23 and had a few surgeries and when she would hike she would be okay but would leak on the way down. Has been wearing pads for years and lately thinks she has bladder POP 2/2 some pressure.   When she first gets up in the morning she will get leakage without much of a full bladder.     Pain: B hips (jabbing)         Best: 0/10        Worst: 6/10 walking on cement, getting out of bed in morning   Imaging performed? None   Pelvic Pain/ Pain with insertion/sex: hasn't been sexually active in years- denies pain with medical examinations     Goes 1x/month to get massages     POP s/s:         Bulging sensation: denies         Heaviness/pressure: no but does get abdominal pressure occasionally with squatting         Splinting to urinate or have bowel movements:     Bladder Habits:         DTF: every 2-3 hours         NTF: 0x/night         Leakage:                   Amount:     a few dribbles to a tbsp leakage                         Aggravating factors: coughing, sneezing, bending, getting out of bed in the morning, with urge and walking to bathroom, squatting                  Alleviating factors:         Urge: reports normal - reports she will urinate just in case sometimes         Difficulty initiating stream:         Difficulty emptying bladder: no but will sometimes lean forward to empty all the way     Bowel Habits: denies issues with bowels    Diet and Lifestyle:         Caffeine intake: 1 cup coffee in morning         H2O intake: 64 oz/day     Prior Health History:   health conditions: HTN, rosacea acne, thyroid condition and hx of ovarian ca   injuries: NONE   surgeries:  Tumor and L ovary removal, hysterectomy, appendectomy, tonsillectomy, wisdom teeth, carpal tunnel   medications: see chart   # pregnancies: adopted 1 son and gave birth to 1 son vaginally     Occupation: retired   Goals for PT: would like to be able to hold urine until she makes it to the toilet     Precautions:  Precautions  STEADI Fall Risk Score (The score of 4 or more indicates an increased risk of falling): 0  Pain:  Pain Assessment  Pain Assessment: 0-10  0-10 (Numeric) Pain Score: 0 - No pain  Prior Level of Function:  Prior Function Per Pt/Caregiver Report  Level of [place]: Independent with ADLs and functional transfers    Objective     Lumbar ROM       Flexion: 100%       Extension: 50%        Sidebending       L    90%    R 75% with R sided hip pain B        Rotation       100% B     STS: dynamic valgus     MMT:           Hip Flex       L 4        R 4          Hip Ext         3+ B with compensation           Hip ABD       L     4-    R 4-          Hip ADD       L    4     R 4          Hip IR           L    4     R 4 with B hip pain           Hip ER          L     4    R 4    ROM:          Hip Flex       WNL B           Hip Ext         L         R           Hip ABD       L     30    R 30          Hip IR           L   30      R 30           Hip ER          L    45     R 45    Pelvic alignment:   TA contraction: Poor functionally     Palpation: TTP B glute max     Outcome Measures:  NIH CPSI: 8      Treatments:  Access Code: [email or web address]  URL: [email or web address]  Date: 05/05/2025  Prepared by: Katty Garnica    Exercises  - Supine Piriformis Stretch with Foot on Ground  - 1 x daily - 7 x weekly - 2 sets - 30-60sec hold  - Supine Butterfly Groin Stretch  - 1 x daily - 7 x weekly - 2 sets - 30-60sec hold  - Supine Diaphragmatic Breathing  - 1 x daily - 7 x weekly - 1 sets - 3-5 hold    Goals:  STGs: 3 weeks     Pt will be able to isolate pelvic floor through ability to contract  and relax PFMs on cue to improve lumbopelvic stability and continence.    Pt will be compliant with participation in home exercise program 3-5x/week with good body mechanics to facilitate independent rehab program upon discharge.    LTGs: 6 weeks    Pt will report 75% improvement in UUI s/s to allow for walking to bathroom without leakage.     Pt will demonstrate improved TA activation to 15sec for reduced back/hip pain with functional activities.     Pt will demonstrate improved strength in all deficient musculature by 1/2 MMT grade to allow for ability to lift without leakage.     Pt will report 75% improvement in NOLVIA s/s with coughing, sneezing, laughing and physical activity to improve participation in ADLs and recreational activities.    Pt will report decreased NIH-CPSI score by 6 points (MCID) to demonstrate improved QOL and ability to perform ADLs. Baseline: 8

## 2025-05-05 NOTE — LETTER
May 5, 2025    Keith Chandler MD  [address]  [facility] [address]    Patient: Nikkie Gomez   YOB: 1950   Date of Visit: 5/5/2025       Dear No referring provider defined for this encounter.    The attached plan of care is being sent to you because your patient’s medical reimbursement requires that you certify the plan of care. Your signature is required to allow uninterrupted insurance coverage.      You may indicate your approval by signing below and faxing this form back to us at Dept Fax: [phone].    Please call Dept: [phone] with any questions or concerns.    Thank you for this referral,        Katty Garnica PT  [address] MEDICAL OFF[facility]ILDING  [address] [facility] [address]    Payer: Payor: MEDICARE / Plan: MEDICARE PART A AND B / Product Type: *No Product type* /                                                                         Date:     Dear Katty Garnica PT,     Re: Ms. Nikkie Gomez, MRN:[id number]    I certify that I have reviewed the attached plan of care and it is medically necessary for Ms. Nikkie Gomez (1950) who is under my care.          ______________________________________                    _________________  Provider name and credentials                                           Date and time                                                                                           Plan of Care 5/5/25   Effective from: 5/5/2025  Effective to: 8/3/2025    Plan ID: [id number]            Participants as of Finalize on 5/5/2025    Name Type Comments Contact Info    Katty Garnica PT Physical Therapist  [phone]    Keith Chandler MD Consulting Physician  [phone]       Last Plan Note     Author: Katty Garnica PT Status: Incomplete Last edited: 5/5/2025 12:45 PM       Physical Therapy Evaluation and Treatment      Patient Name: Nikkie Gomez  MRN:  [id number]  Today's Date: 5/5/2025    Time Entry:                           INS MEDICARE/ AARP                           COPAY 0   2410($0 USED )  (MET) COVERAGE 80/100 OOP 0    AARP TO FOLLOW MEDICARE  NO AUTH REQ         MCR CERT PERIOD: 5/5/25-8/3/25   Visit #1    Assessment:    Nikkie Gomez is a 74 y.o.  female presenting with c/o urinary incontinence and concerns for POP and intermittent hip pain. Upon examination patient demonstrates decreased hip and lumbar ROM, decreased hip and core strength, impaired pressure management techniques, and mild tenderness in B hips . Functional limitations and participations restrictions include walking on cement, coughing, sneezing, laughing, bending, lifting, walking to bathroom with urge and getting out of bed.  The clinical presentation of this patient is stable and their history and examination findings are consistent with a low complexity evaluation with good rehab potential. Pt will benefit from skilled PT intervention 1 x/week for 6-8 weeks to address limitations listed above and achieve desired goals.      Plan:  OP PT Plan  Treatment/Interventions: Biofeedback, Cryotherapy, Dry needling, Education/ Instruction, Electrical stimulation, Gait training, Hot pack, Manual therapy, Neuromuscular re-education, Self care/ home management, Taping techniques, Therapeutic activities, Therapeutic exercises  PT Plan: Skilled PT  PT Frequency: 1 time per week  Duration: 6-8 weeks  Certification Period Start Date: 05/05/25  Certification Period End Date: 08/03/25  Number of Treatments Authorized: MEDICARE/ AARP                           COPAY 0   2410($0 USED )  (MET) COVERAGE 80/100 OOP 0    AARP TO FOLLOW MEDICARE  NO AUTH REQ  Rehab Potential: Good  Plan of Care Agreement: Patient    Current Problem:   1. Bilateral hip pain        2. Mixed stress and urge urinary incontinence            Subjective   General:  NOLVIA   Per chart review: hx of Hereditary leiomyoma and renal  cell carcinoma with hx of microhematuria most likely associated with radiation cystitis due to ovarian ca s/p resection (1974) and XRT and leiomyomas   General  Reason for Referral: PFPT    Had ovarian CA at age 23 and had a few surgeries and when she would hike she would be okay but would leak on the way down. Has been wearing pads for years and lately thinks she has bladder POP 2/2 some pressure.   When she first gets up in the morning she will get leakage without much of a full bladder.     Pain: B hips (jabbing)         Best: 0/10        Worst: 6/10 walking on cement, getting out of bed in morning   Imaging performed? None   Pelvic Pain/ Pain with insertion/sex: hasn't been sexually active in years- denies pain with medical examinations     Goes 1x/month to get massages     POP s/s:         Bulging sensation: denies         Heaviness/pressure: no but does get abdominal pressure occasionally with squatting         Splinting to urinate or have bowel movements:     Bladder Habits:         DTF: every 2-3 hours         NTF: 0x/night         Leakage:                   Amount:     a few dribbles to a tbsp leakage                         Aggravating factors: coughing, sneezing, bending, getting out of bed in the morning, with urge and walking to bathroom, squatting                  Alleviating factors:         Urge: reports normal - reports she will urinate just in case sometimes         Difficulty initiating stream:         Difficulty emptying bladder: no but will sometimes lean forward to empty all the way     Bowel Habits: denies issues with bowels    Diet and Lifestyle:         Caffeine intake: 1 cup coffee in morning         H2O intake: 64 oz/day     Prior Health History:   health conditions: HTN, rosacea acne, thyroid condition and hx of ovarian ca   injuries: NONE   surgeries: Tumor and L ovary removal, hysterectomy, appendectomy, tonsillectomy, wisdom teeth, carpal tunnel   medications: see chart   #  pregnancies: adopted 1 son and gave birth to 1 son vaginally     Occupation: retired   Goals for PT: would like to be able to hold urine until she makes it to the toilet     Precautions:  Precautions  STEADI Fall Risk Score (The score of 4 or more indicates an increased risk of falling): 0  Pain:  Pain Assessment  Pain Assessment: 0-10  0-10 (Numeric) Pain Score: 0 - No pain  Prior Level of Function:  Prior Function Per Pt/Caregiver Report  Level of [place]: Independent with ADLs and functional transfers    Objective    Lumbar ROM       Flexion: 100%       Extension: 50%        Sidebending       L    90%    R 75% with R sided hip pain B        Rotation       100% B     STS: dynamic valgus     MMT:           Hip Flex       L 4        R 4          Hip Ext         3+ B with compensation           Hip ABD       L     4-    R 4-          Hip ADD       L    4     R 4          Hip IR           L    4     R 4 with B hip pain           Hip ER          L     4    R 4    ROM:          Hip Flex       WNL B           Hip Ext         L         R           Hip ABD       L     30    R 30          Hip IR           L   30      R 30           Hip ER          L    45     R 45    Pelvic alignment:   TA contraction: Poor functionally     Palpation: TTP B glute max     Outcome Measures:  NIH CPSI: 8      Treatments:  Access Code: [email or web address]  URL: [email or web address]  Date: 05/05/2025  Prepared by: Katty Garnica    Exercises  - Supine Piriformis Stretch with Foot on Ground  - 1 x daily - 7 x weekly - 2 sets - 30-60sec hold  - Supine Butterfly Groin Stretch  - 1 x daily - 7 x weekly - 2 sets - 30-60sec hold  - Supine Diaphragmatic Breathing  - 1 x daily - 7 x weekly - 1 sets - 3-5 hold    Goals:  STGs: 3 weeks     Pt will be able to isolate pelvic floor through ability to contract and relax PFMs on cue to improve lumbopelvic stability and continence.    Pt will be compliant with participation in home exercise  program 3-5x/week with good body mechanics to facilitate independent rehab program upon discharge.    LTGs: 6 weeks    Pt will report 75% improvement in UUI s/s to allow for walking to bathroom without leakage.     Pt will demonstrate improved TA activation to 15sec for reduced back/hip pain with functional activities.     Pt will demonstrate improved strength in all deficient musculature by 1/2 MMT grade to allow for ability to lift without leakage.     Pt will report 75% improvement in NOLVIA s/s with coughing, sneezing, laughing and physical activity to improve participation in ADLs and recreational activities.    Pt will report decreased NIH-CPSI score by 6 points (MCID) to demonstrate improved QOL and ability to perform ADLs. Baseline: 8           Current Participants as of 5/5/2025    Name Type Comments Contact Info    Katty Garnica, PT Physical Therapist  [phone]    Signature pending    Keith Chandler MD Consulting Physician  [phone]    Signature pending

## 2025-05-06 ENCOUNTER — TELEPHONE (OUTPATIENT)
Dept: OTHER | Age: 75
End: 2025-05-06
Payer: MEDICARE

## 2025-05-06 DIAGNOSIS — F33.41 RECURRENT MAJOR DEPRESSIVE DISORDER, IN PARTIAL REMISSION (CMS-HCC): ICD-10-CM

## 2025-05-06 DIAGNOSIS — F41.9 ANXIETY: ICD-10-CM

## 2025-05-06 DIAGNOSIS — F41.8 DEPRESSION WITH ANXIETY: ICD-10-CM

## 2025-05-06 RX ORDER — MIRTAZAPINE 15 MG/1
TABLET, FILM COATED ORAL
Qty: 90 TABLET | Refills: 1 | Status: SHIPPED | OUTPATIENT
Start: 2025-05-06

## 2025-05-06 RX ORDER — DULOXETIN HYDROCHLORIDE 30 MG/1
CAPSULE, DELAYED RELEASE ORAL
Qty: 180 CAPSULE | Refills: 1 | Status: SHIPPED | OUTPATIENT
Start: 2025-05-06

## 2025-05-08 ENCOUNTER — TELEPHONE (OUTPATIENT)
Dept: PHYSICAL THERAPY | Facility: CLINIC | Age: 75
End: 2025-05-08
Payer: MEDICARE

## 2025-05-08 DIAGNOSIS — N39.46 MIXED STRESS AND URGE URINARY INCONTINENCE: ICD-10-CM

## 2025-05-08 DIAGNOSIS — M25.551 BILATERAL HIP PAIN: Primary | ICD-10-CM

## 2025-05-08 DIAGNOSIS — M25.552 BILATERAL HIP PAIN: Primary | ICD-10-CM

## 2025-05-08 NOTE — PROGRESS NOTES
Spoke to patient, clarified with patient PT referral not required, PT POC has been sent to Dr Chandler.

## 2025-05-20 ENCOUNTER — APPOINTMENT (OUTPATIENT)
Dept: BEHAVIORAL HEALTH | Facility: CLINIC | Age: 75
End: 2025-05-20
Payer: MEDICARE

## 2025-05-21 ENCOUNTER — TREATMENT (OUTPATIENT)
Dept: PHYSICAL THERAPY | Facility: HOSPITAL | Age: 75
End: 2025-05-21
Payer: MEDICARE

## 2025-05-21 DIAGNOSIS — M25.551 BILATERAL HIP PAIN: Primary | ICD-10-CM

## 2025-05-21 DIAGNOSIS — M25.552 BILATERAL HIP PAIN: Primary | ICD-10-CM

## 2025-05-21 DIAGNOSIS — N39.46 MIXED STRESS AND URGE URINARY INCONTINENCE: ICD-10-CM

## 2025-05-21 PROCEDURE — 97140 MANUAL THERAPY 1/> REGIONS: CPT | Mod: GP

## 2025-05-21 PROCEDURE — 97110 THERAPEUTIC EXERCISES: CPT | Mod: GP

## 2025-05-21 NOTE — PROGRESS NOTES
Physical Therapy Evaluation and Treatment      Patient Name: Nikkie Gomez  MRN: [id number]  Today's Date: 5/21/2025    Time Entry:   Time Calculation  Start Time: 1515  Stop Time: 1608  Time Calculation (min): 53 min     PT Therapeutic Procedures Time Entry  Manual Therapy Time Entry: 8  Therapeutic Exercise Time Entry: 45                 INS MEDICARE/ AARP                           COPAY 0   2410($0 USED )  (MET) COVERAGE 80/100 OOP 0    AARP TO FOLLOW MEDICARE  NO AUTH REQ         MCR CERT PERIOD: 5/5/25-8/3/25   Visit #2    Assessment:    Tx focused on progressing strengthening today to allow for support to PFMs. Pt reports she would like to avoid internal work and focused on external work and strengthening. Pt reports having good progress with therapy already and anticipate good response to strengthening. Pt reports feeling really good at end of session.       Plan:   Progress strengthening and gentle mobility     Current Problem:   1. Bilateral hip pain        2. Mixed stress and urge urinary incontinence            Subjective    General:  B hip pain: has been okay in general- denies pains currently.   NOLVIA/UUI: reports that might be a little better with both NOLVIA and UUI.   Gets occasional lower abdominal pressure  Reports she doesn't want to do any internal work here.     Reports her leg cramps have gotten worse since doing her exercises at home.       Precautions:     Pain:       Objective       Treatments:  TherEx:   Bridge on heels 2x10   Hip ADD 5 sec isometric 2x10   Hip ABD RTB 5sec isometric 2x10   HL lumbar rotation x20  Piriformis stretch x30sec B     Manual:   Gentle TrP to L hip flexors to reduce mm spasm and abdominal pressure     HEP:   Access Code: [email or web address]  URL: [email or web address]  Date: 05/05/2025  Prepared by: Katty Garnica    Exercises  - Supine Piriformis Stretch with Foot on Ground  - 1 x daily - 7 x weekly - 2 sets - 30-60sec hold  - Supine Butterfly Groin  Stretch  - 1 x daily - 7 x weekly - 2 sets - 30-60sec hold  - Supine Diaphragmatic Breathing  - 1 x daily - 7 x weekly - 1 sets - 3-5 hold

## 2025-05-28 ENCOUNTER — TREATMENT (OUTPATIENT)
Dept: PHYSICAL THERAPY | Facility: HOSPITAL | Age: 75
End: 2025-05-28
Payer: MEDICARE

## 2025-05-28 DIAGNOSIS — M25.551 BILATERAL HIP PAIN: Primary | ICD-10-CM

## 2025-05-28 DIAGNOSIS — N39.46 MIXED STRESS AND URGE URINARY INCONTINENCE: ICD-10-CM

## 2025-05-28 DIAGNOSIS — M25.552 BILATERAL HIP PAIN: Primary | ICD-10-CM

## 2025-05-28 PROCEDURE — 97140 MANUAL THERAPY 1/> REGIONS: CPT | Mod: GP

## 2025-05-28 PROCEDURE — 97535 SELF CARE MNGMENT TRAINING: CPT | Mod: GP

## 2025-05-28 NOTE — PROGRESS NOTES
Physical Therapy Evaluation and Treatment      Patient Name: Nkikie Gomez  MRN: [id number]  Today's Date: 2025    Time Entry:   Time Calculation  Start Time: 145  Stop Time: 230  Time Calculation (min): 45 min     PT Therapeutic Procedures Time Entry  Manual Therapy Time Entry: 25  Self-Care/Home Mgmt Trainin                 INS MEDICARE/ AARP                           COPAY 0   2410($0 USED )  (MET) COVERAGE 80/100 OOP 0    AARP TO FOLLOW MEDICARE  NO AUTH REQ         MCR CERT PERIOD: 25-8/3/25   Visit #3    Assessment:    Focused on manual therapy to address constipation and improve scar tissue mobility to reduce pressure in lower abdomen. Pt tolerated well with reduced sensations of pressure at end of session. Reviewed constipation education and self colonic massage to assist with symptoms.       Plan:   Progress strengthening and gentle mobility     Current Problem:   1. Bilateral hip pain        2. Mixed stress and urge urinary incontinence            Subjective    General:  Reports her bladder is a little better lately. Has stopped going to fitness center lately 2/2 worries over sweating and rosacea acne issues.   Uses incontinence pads and was originally uses 3/day and is now down to 1/day.   Thinks she isn't drinking enough.   Currently dealing with some lower abdominal discomforts/pressure. Has had some yovana in the morning and then will have a normal bowel movement after drinking coffee.     Precautions:     Pain:       Objective       Treatments:    Manual:   Colonic massage to improve GI motility     Cupping and scar tissue mobility to abdomen to reduce adhesions and lower belly discomfort.     Self care:   Pt educated on bladder norms including urinating every 3-4 hours and urinating 0x/night prior to age 60, 1x/night after age 60 and adding 1x/night with every decade thereafter. Pt also educated on water intake norms of 64oz / day or 1/2 body weight in oz.    Pt educated on  importance of creating bowel routine with maintaining routine for predictable bowel movements and promoting larger bowel movements to reduce frequency with diarrhea or stimulating bowel movement if having constipation including:   - regular eating habits  - sleep habits  - incorporating aerobic activity for GI motility and peristalsis to stimulate BM   - eating more soluble and insoluble fiber to maintain normal stool bulk to promote optimal stool consistency for BMs  - drinking warm fluid stimulating BM through increasing parasympathetic nervous system      HEP:   Access Code: [email or web address]  URL: [email or web address]  Date: 05/05/2025  Prepared by: Katty Garnica    Exercises  - Supine Piriformis Stretch with Foot on Ground  - 1 x daily - 7 x weekly - 2 sets - 30-60sec hold  - Supine Butterfly Groin Stretch  - 1 x daily - 7 x weekly - 2 sets - 30-60sec hold  - Supine Diaphragmatic Breathing  - 1 x daily - 7 x weekly - 1 sets - 3-5 hold

## 2025-06-04 ENCOUNTER — TREATMENT (OUTPATIENT)
Dept: PHYSICAL THERAPY | Facility: HOSPITAL | Age: 75
End: 2025-06-04
Payer: MEDICARE

## 2025-06-04 DIAGNOSIS — M25.551 BILATERAL HIP PAIN: Primary | ICD-10-CM

## 2025-06-04 DIAGNOSIS — M25.552 BILATERAL HIP PAIN: Primary | ICD-10-CM

## 2025-06-04 DIAGNOSIS — N39.46 MIXED STRESS AND URGE URINARY INCONTINENCE: ICD-10-CM

## 2025-06-04 PROCEDURE — 97110 THERAPEUTIC EXERCISES: CPT | Mod: GP

## 2025-06-04 PROCEDURE — 97535 SELF CARE MNGMENT TRAINING: CPT | Mod: GP

## 2025-06-04 NOTE — PROGRESS NOTES
Physical Therapy Evaluation and Treatment      Patient Name: Nikkie Gomez  MRN: [id number]  Today's Date: 2025    Time Entry:   Time Calculation  Start Time: 145  Stop Time: 241  Time Calculation (min): 56 min     PT Therapeutic Procedures Time Entry  Therapeutic Exercise Time Entry: 31  Self-Care/Home Mgmt Trainin                 INS MEDICARE/ AARP                           COPAY 0   2410($0 USED )  (MET) COVERAGE 80/100 OOP 0    AARP TO FOLLOW MEDICARE  NO AUTH REQ         MCR CERT PERIOD: 25-8/3/25   Visit #4    Assessment:    Reviewed urge suppression techniques today 2/2 UUI first thing in the morning. Due to pt's regression in exercises, discussed importance of compliance to allow for progress towards goals 2/2 improvement with symptoms when compliant. Pt challenged with hip strengthening today and HEP updated.       Plan:   Progress strengthening and gentle mobility   NO INTERNAL WORK      Current Problem:   1. Bilateral hip pain        2. Mixed stress and urge urinary incontinence            Subjective    General:  Reports she was a little lax with her HEP lately 2/2 feeling down that her  broke.   Reports she is leaking again and went through 2 pads yesterday. The other day she picked up her dog and felt a dribble. Still getting a little UUI in the mornings now. Waking between 5-6am and will dribble on way to the bathroom.     Precautions:     Pain:       Objective       Treatments:  TherEx: 31  Bridges 3x10   SLR 2x10   Clamshells 2x10 B   Piriformis stretch x60sec B  Happy baby x60sec    Self care: 25  Discussion on proper shoewear. Discussed shoe stores such as second sole/fleet feet to assess gait and recommend certain shoes.     Urge: Pt educated on urge urinary incontinence regarding increased bladder sensitivity to filling of urine 2/2 training to hold reduced urine volume signaling micturition reflex.     Behavioral training: Pt educated on behavioral interventions to  retrain bladder to reduce urge, frequency and incontinence.              Pt educated on urge suppression techniques (perineal pressure, kegels, ankle pumps) to reduce urinary urge and retrain bladder to retain increased urine volume.             Pt educated on limiting bladder irritants to reduce bladder lining irritation contributing to increased urge as well as increasing H2O intake to reduce urine concentration that may be contributing to bladder irritation.             Pt educated on increasing time between voids to allow for increased urine volume in bladder to retrain the bladder to hold more urine, reduce frequency and urge to void.    Discussion on fiber supplementation as needed for constipation management and importance of fluid intake.       HEP:   Access Code: [email or web address]  URL: [email or web address]  Date: 05/05/2025  Prepared by: Katty Garnica

## 2025-06-11 ENCOUNTER — TREATMENT (OUTPATIENT)
Dept: PHYSICAL THERAPY | Facility: HOSPITAL | Age: 75
End: 2025-06-11
Payer: MEDICARE

## 2025-06-11 DIAGNOSIS — N39.46 MIXED STRESS AND URGE URINARY INCONTINENCE: ICD-10-CM

## 2025-06-11 DIAGNOSIS — M25.551 BILATERAL HIP PAIN: Primary | ICD-10-CM

## 2025-06-11 DIAGNOSIS — M25.552 BILATERAL HIP PAIN: Primary | ICD-10-CM

## 2025-06-11 PROCEDURE — 97535 SELF CARE MNGMENT TRAINING: CPT | Mod: GP

## 2025-06-11 PROCEDURE — 97110 THERAPEUTIC EXERCISES: CPT | Mod: GP

## 2025-06-11 NOTE — PROGRESS NOTES
Physical Therapy Evaluation and Treatment      Patient Name: Nikkie Gomez  MRN: [id number]  Today's Date: 6/11/2025    Time Entry:   Time Calculation  Start Time: 1348  Stop Time: 1441  Time Calculation (min): 53 min     PT Therapeutic Procedures Time Entry  Therapeutic Exercise Time Entry: 38  Self-Care/Home Mgmt Training: 15                 INS MEDICARE/ [employer]                           COPAY 0   2410($0 USED )  (MET) COVERAGE 80/100 OOP 0    [employer] TO FOLLOW MEDICARE  NO AUTH REQ         MCR CERT PERIOD: 5/5/25-8/3/25   Visit #5    Assessment:    Continued to educate regarding urge suppression and bladder irritants. Discussed bladder diary to assess triggers and pt agreeable- pt given 3 days to assess s/s. Focused on hip and core strengthening today with fair tolerance.       Plan:   Progress strengthening and gentle mobility   NO INTERNAL WORK  Review bladder diary     Current Problem:   1. Bilateral hip pain        2. Mixed stress and urge urinary incontinence            Subjective    General:    Reports pelvic floor has been pretty good. Going through 1-2pads/day and not putting oversized night pads on anymore. Noticing most of her leakage with picking up her dog, and walking to bathroom with the urge.   Drinking 3-4 coombs/day.       Precautions:     Pain:       Objective       Treatments:  TherEx: 38  NuStep level 3 x8 min   Squats 2x10   Standing hip ABD 2x10 B   Standing hip Ext 2x10 B   Step up to march on 6inch step 2x10 B  Bridges 2x10   Piriformis stretch x60sec B  Happy baby x60sec    Self care: 15  Discussion on urge suppression technique proper use and bladder irritants.     Pt educated on use of bladder diary to assess regular bladder habits, frequency, urge, leakage, triggers and fluid intake/output.    HEP:   Access Code: [email or web address]  URL: [email or web address]  Date: 05/05/2025  Prepared by: Katty Garnica

## 2025-06-18 ENCOUNTER — TREATMENT (OUTPATIENT)
Dept: PHYSICAL THERAPY | Facility: HOSPITAL | Age: 75
End: 2025-06-18
Payer: MEDICARE

## 2025-06-18 DIAGNOSIS — M25.551 BILATERAL HIP PAIN: Primary | ICD-10-CM

## 2025-06-18 DIAGNOSIS — N39.46 MIXED STRESS AND URGE URINARY INCONTINENCE: ICD-10-CM

## 2025-06-18 DIAGNOSIS — M25.552 BILATERAL HIP PAIN: Primary | ICD-10-CM

## 2025-06-18 PROCEDURE — 97535 SELF CARE MNGMENT TRAINING: CPT | Mod: GP

## 2025-06-18 PROCEDURE — 97110 THERAPEUTIC EXERCISES: CPT | Mod: GP

## 2025-06-18 NOTE — PROGRESS NOTES
Physical Therapy Evaluation and Treatment      Patient Name: Nikkie Gomez  MRN: [id number]  Today's Date: 6/18/2025    Time Entry:   Time Calculation  Start Time: 0248  Stop Time: 0341  Time Calculation (min): 53 min     PT Therapeutic Procedures Time Entry  Therapeutic Exercise Time Entry: 38  Self-Care/Home Mgmt Training: 15                 [place] MEDICARE/ [employer]                           COPAY 0   2410($0 USED )  (MET) COVERAGE 80/100 OOP 0    [employer] TO FOLLOW MEDICARE  NO AUTH REQ         MCR CERT PERIOD: 5/5/25-8/3/25   Visit #6    Assessment:    Reviewed topics previously discussed at prior PT appointments and gave clarification on: bladder irritation, urge suppression techniques, filling out of bladder diary and exercise form. Pt given new bladder diary to complete for home. Pt challenged with TA activation today and improves with forced elongated exhale.       Plan:   Progress strengthening and gentle mobility   NO INTERNAL WORK  Review bladder diary     Current Problem:   1. Bilateral hip pain        2. Mixed stress and urge urinary incontinence            Subjective    General:    Reports she was a bit of a slacker over the past week. Had a few instances of UUI and reports she wasn't great at the urge suppression techniques. Reports she was more distracted and down in the dumps this week which impacted compliance.   Was confused about the bladder diary and didn't bring it in.   Reports exercises are going well.  Using 1-2 pads/day currently.       Precautions:     Pain:       Objective       Treatments:  TherEx: 38  NuStep level 4 x8 min   Squats 2x10   Hip Hinging 2x10   Lifting mechanics with block  with exhale  x10 squat, x10 hinging   Seated hip ADD 5 sec hold x10   Seated TA activation with forced exhale for increased TA activation x20 with therapist assist       Self care: 15  Discussion on urge suppression technique proper use and bladder irritants.     Pt educated on use of bladder diary to  assess regular bladder habits, frequency, urge, leakage, triggers and fluid intake/output.    HEP:   Access Code: [email or web address]  URL: [email or web address]  Date: 05/05/2025  Prepared by: Katty Garnica

## 2025-06-24 ENCOUNTER — TREATMENT (OUTPATIENT)
Dept: PHYSICAL THERAPY | Facility: HOSPITAL | Age: 75
End: 2025-06-24
Payer: MEDICARE

## 2025-06-24 DIAGNOSIS — M25.551 BILATERAL HIP PAIN: Primary | ICD-10-CM

## 2025-06-24 DIAGNOSIS — N39.46 MIXED STRESS AND URGE URINARY INCONTINENCE: ICD-10-CM

## 2025-06-24 DIAGNOSIS — M25.552 BILATERAL HIP PAIN: Primary | ICD-10-CM

## 2025-06-24 PROCEDURE — 97110 THERAPEUTIC EXERCISES: CPT | Mod: GP

## 2025-06-24 PROCEDURE — 97530 THERAPEUTIC ACTIVITIES: CPT | Mod: GP

## 2025-06-24 NOTE — PROGRESS NOTES
Physical Therapy Evaluation and Treatment      Patient Name: Nikkie Gomez  MRN: [id number]  Today's Date: 6/24/2025    Time Entry:   Time Calculation  Start Time: 1544  Stop Time: 1625  Time Calculation (min): 41 min     PT Therapeutic Procedures Time Entry  Therapeutic Exercise Time Entry: 24  Therapeutic Activity Time Entry: 15                 [employer] MEDICARE/ [employer]                           COPAY 0   2410($0 USED )  (MET) COVERAGE 80/100 OOP 0    [employer] TO FOLLOW MEDICARE  NO AUTH REQ         MCR CERT PERIOD: 5/5/25-8/3/25   Visit #7    Assessment:    Pt has attended 7 consecutive physical therapy sessions 2/2  urinary incontinence and concerns for POP and intermittent hip pain.   This pt has shown improvement in PF s/s, pain intensity and frequency, strength, ROM and PF control/ coordination.   This pt continues to show decreased hip strength.   Functional limitations include: rare leakage with urge.   Pt is being placed on HOLD x30 days to assess progress on own at home.     Plan:   HOLD x30 days     Current Problem:   1. Bilateral hip pain  Follow Up In Physical Therapy      2. Mixed stress and urge urinary incontinence  Follow Up In Physical Therapy          Subjective    General:  Thinks that overall she has been doing pretty good.  Has had a little leakage only 1-2x with really full bladder over the past week.   Reports she is really happy that she ended up here. Will just change pad out of habit or routine now.   Reports exercises are going well.       Precautions:     Pain:       Objective   Lumbar ROM       Flexion: 100%       Extension: 75%        Sidebending      90% B        Rotation       100% B      STS: minimal dynamic valgus      MMT:           Hip Flex       L 4        R 4+          Hip Ext         4 B          Hip ABD       L     4    R 4          Hip ADD       L    4+     R 4+          Hip IR           L    4+     R 4+           Hip ER          L     4+    R 4+     ROM:          Hip Flex       WNL  B           Hip Ext         L         R           Hip ABD       L     30    R 30          Hip IR           L   30      R 30           Hip ER          L    65     R 65     TA contraction: Fair      Palpation: TTP B glute max     Treatments:  TherEx: 24  3 way bridge x10 each   Clamshells 2x10 B   Seated hip IR with ADD squeeze 2x10 B   3 way hip with YTB around ankles x10 each       TherAct: 15  Reassessment performed today to assess progress towards goals. Pt educated on findings of reassessment, plan of care, HEP and goals to promote pt progress for reduced s/s. See ortho section for updated objective measures.      HEP:   Access Code: [email or web address]  URL: [email or web address]  Date: 05/05/2025  Prepared by: Katty Garnica    Goals:  STGs: 3 weeks      Pt will be able to isolate pelvic floor through ability to contract and relax PFMs on cue to improve lumbopelvic stability and continence. GOAL PROGRESSING FUNCTIONALLY      Pt will be compliant with participation in home exercise program 3-5x/week with good body mechanics to facilitate independent rehab program upon discharge. GOAL MET      LTGs: 6 weeks     Pt will report 75% improvement in UUI s/s to allow for walking to bathroom without leakage. GOAL MET: pt reports 85% better      Pt will demonstrate improved TA activation to 15sec for reduced back/hip pain with functional activities. GOAL MET FUNTIONALLY      Pt will demonstrate improved strength in all deficient musculature by 1/2 MMT grade to allow for ability to lift without leakage. GOAL ALMOST MET      Pt will report 75% improvement in NOLVIA s/s with coughing, sneezing, laughing and physical activity to improve participation in ADLs and recreational activities. GOAL MET- reports 85% better      Pt will report decreased NIH-CPSI score by 6 points (MCID) to demonstrate improved QOL and ability to perform ADLs. Baseline: 8 GOAL MET: score of 2

## 2025-06-26 ENCOUNTER — APPOINTMENT (OUTPATIENT)
Dept: PHYSICAL THERAPY | Facility: HOSPITAL | Age: 75
End: 2025-06-26
Payer: MEDICARE

## 2025-06-26 DIAGNOSIS — N39.46 MIXED STRESS AND URGE URINARY INCONTINENCE: ICD-10-CM

## 2025-06-26 DIAGNOSIS — M25.552 BILATERAL HIP PAIN: Primary | ICD-10-CM

## 2025-06-26 DIAGNOSIS — M25.551 BILATERAL HIP PAIN: Primary | ICD-10-CM

## 2025-08-22 ENCOUNTER — APPOINTMENT (OUTPATIENT)
Dept: BEHAVIORAL HEALTH | Facility: CLINIC | Age: 75
End: 2025-08-22
Payer: MEDICARE

## 2025-08-22 DIAGNOSIS — F41.8 DEPRESSION WITH ANXIETY: ICD-10-CM

## 2025-08-22 DIAGNOSIS — F41.9 ANXIETY: ICD-10-CM

## 2025-08-22 PROCEDURE — 1159F MED LIST DOCD IN RCRD: CPT | Performed by: PSYCHIATRY & NEUROLOGY

## 2025-08-22 PROCEDURE — 1160F RVW MEDS BY RX/DR IN RCRD: CPT | Performed by: PSYCHIATRY & NEUROLOGY

## 2025-08-22 PROCEDURE — 1036F TOBACCO NON-USER: CPT | Performed by: PSYCHIATRY & NEUROLOGY

## 2025-08-22 PROCEDURE — 99214 OFFICE O/P EST MOD 30 MIN: CPT | Performed by: PSYCHIATRY & NEUROLOGY

## 2025-08-22 RX ORDER — MIRTAZAPINE 15 MG/1
15 TABLET, FILM COATED ORAL NIGHTLY
Qty: 90 TABLET | Refills: 1 | Status: SHIPPED | OUTPATIENT
Start: 2025-08-22

## 2025-08-22 RX ORDER — LORAZEPAM 0.5 MG/1
TABLET ORAL
Qty: 5 TABLET | Refills: 0 | Status: SHIPPED | OUTPATIENT
Start: 2025-08-22

## 2025-11-20 ENCOUNTER — APPOINTMENT (OUTPATIENT)
Dept: BEHAVIORAL HEALTH | Facility: CLINIC | Age: 75
End: 2025-11-20
Payer: MEDICARE

## 2025-12-19 ENCOUNTER — APPOINTMENT (OUTPATIENT)
Dept: BEHAVIORAL HEALTH | Facility: CLINIC | Age: 75
End: 2025-12-19
Payer: MEDICARE